# Patient Record
Sex: FEMALE | Race: WHITE | Employment: OTHER | URBAN - NONMETROPOLITAN AREA
[De-identification: names, ages, dates, MRNs, and addresses within clinical notes are randomized per-mention and may not be internally consistent; named-entity substitution may affect disease eponyms.]

---

## 2017-12-05 ENCOUNTER — APPOINTMENT (OUTPATIENT)
Dept: GENERAL RADIOLOGY | Age: 82
DRG: 595 | End: 2017-12-05
Payer: MEDICARE

## 2017-12-05 ENCOUNTER — HOSPITAL ENCOUNTER (INPATIENT)
Age: 82
LOS: 3 days | Discharge: NURSING, REHAB OR LONG-TERM CARE | DRG: 595 | End: 2017-12-09
Attending: INTERNAL MEDICINE | Admitting: INTERNAL MEDICINE
Payer: MEDICARE

## 2017-12-05 DIAGNOSIS — I21.4 NON-STEMI (NON-ST ELEVATED MYOCARDIAL INFARCTION) (HCC): Primary | ICD-10-CM

## 2017-12-05 PROBLEM — B02.9 SHINGLES: Status: ACTIVE | Noted: 2017-12-05

## 2017-12-05 LAB
ALBUMIN SERPL-MCNC: 2.9 G/DL (ref 3.5–5.1)
ALP BLD-CCNC: 68 U/L (ref 38–126)
ALT SERPL-CCNC: 18 U/L (ref 11–66)
ANION GAP SERPL CALCULATED.3IONS-SCNC: 12 MEQ/L (ref 8–16)
AST SERPL-CCNC: 24 U/L (ref 5–40)
BASOPHILS # BLD: 0.4 %
BASOPHILS ABSOLUTE: 0 THOU/MM3 (ref 0–0.1)
BILIRUB SERPL-MCNC: 0.4 MG/DL (ref 0.3–1.2)
BUN BLDV-MCNC: 31 MG/DL (ref 7–22)
CALCIUM SERPL-MCNC: 8.4 MG/DL (ref 8.5–10.5)
CHLORIDE BLD-SCNC: 104 MEQ/L (ref 98–111)
CO2: 25 MEQ/L (ref 23–33)
CREAT SERPL-MCNC: 0.7 MG/DL (ref 0.4–1.2)
EOSINOPHIL # BLD: 0.7 %
EOSINOPHILS ABSOLUTE: 0.1 THOU/MM3 (ref 0–0.4)
GFR SERPL CREATININE-BSD FRML MDRD: 79 ML/MIN/1.73M2
GLUCOSE BLD-MCNC: 188 MG/DL (ref 70–108)
HCT VFR BLD CALC: 39.3 % (ref 37–47)
HEMOGLOBIN: 13.4 GM/DL (ref 12–16)
LACTIC ACID: 2.8 MMOL/L (ref 0.5–2.2)
LYMPHOCYTES # BLD: 26.7 %
LYMPHOCYTES ABSOLUTE: 2 THOU/MM3 (ref 1–4.8)
MCH RBC QN AUTO: 31.8 PG (ref 27–31)
MCHC RBC AUTO-ENTMCNC: 34 GM/DL (ref 33–37)
MCV RBC AUTO: 93.4 FL (ref 81–99)
MONOCYTES # BLD: 10.1 %
MONOCYTES ABSOLUTE: 0.8 THOU/MM3 (ref 0.4–1.3)
NUCLEATED RED BLOOD CELLS: 0 /100 WBC
OSMOLALITY CALCULATION: 292.8 MOSMOL/KG (ref 275–300)
PDW BLD-RTO: 14.1 % (ref 11.5–14.5)
PLATELET # BLD: 211 THOU/MM3 (ref 130–400)
PMV BLD AUTO: 9.2 MCM (ref 7.4–10.4)
POTASSIUM SERPL-SCNC: 3.7 MEQ/L (ref 3.5–5.2)
RBC # BLD: 4.21 MILL/MM3 (ref 4.2–5.4)
SEG NEUTROPHILS: 62.1 %
SEGMENTED NEUTROPHILS ABSOLUTE COUNT: 4.7 THOU/MM3 (ref 1.8–7.7)
SODIUM BLD-SCNC: 141 MEQ/L (ref 135–145)
TOTAL PROTEIN: 6.1 G/DL (ref 6.1–8)
TROPONIN T: 0.02 NG/ML
WBC # BLD: 7.6 THOU/MM3 (ref 4.8–10.8)

## 2017-12-05 PROCEDURE — 36415 COLL VENOUS BLD VENIPUNCTURE: CPT

## 2017-12-05 PROCEDURE — 99284 EMERGENCY DEPT VISIT MOD MDM: CPT

## 2017-12-05 PROCEDURE — 84484 ASSAY OF TROPONIN QUANT: CPT

## 2017-12-05 PROCEDURE — 81001 URINALYSIS AUTO W/SCOPE: CPT

## 2017-12-05 PROCEDURE — 83605 ASSAY OF LACTIC ACID: CPT

## 2017-12-05 PROCEDURE — G0378 HOSPITAL OBSERVATION PER HR: HCPCS

## 2017-12-05 PROCEDURE — 85025 COMPLETE CBC W/AUTO DIFF WBC: CPT

## 2017-12-05 PROCEDURE — 87040 BLOOD CULTURE FOR BACTERIA: CPT

## 2017-12-05 PROCEDURE — 96375 TX/PRO/DX INJ NEW DRUG ADDON: CPT

## 2017-12-05 PROCEDURE — 96374 THER/PROPH/DIAG INJ IV PUSH: CPT

## 2017-12-05 PROCEDURE — 71020 XR CHEST STANDARD TWO VW: CPT

## 2017-12-05 PROCEDURE — 2580000003 HC RX 258: Performed by: PHYSICIAN ASSISTANT

## 2017-12-05 PROCEDURE — 80053 COMPREHEN METABOLIC PANEL: CPT

## 2017-12-05 PROCEDURE — 6360000002 HC RX W HCPCS: Performed by: PHYSICIAN ASSISTANT

## 2017-12-05 RX ORDER — DEXTROSE MONOHYDRATE 50 MG/ML
INJECTION, SOLUTION INTRAVENOUS CONTINUOUS
Status: DISCONTINUED | OUTPATIENT
Start: 2017-12-05 | End: 2017-12-05

## 2017-12-05 RX ORDER — LORAZEPAM 2 MG/ML
1 INJECTION INTRAMUSCULAR EVERY 6 HOURS PRN
Status: DISCONTINUED | OUTPATIENT
Start: 2017-12-05 | End: 2017-12-05

## 2017-12-05 RX ORDER — DIPHENHYDRAMINE HYDROCHLORIDE 50 MG/ML
25 INJECTION INTRAMUSCULAR; INTRAVENOUS ONCE
Status: COMPLETED | OUTPATIENT
Start: 2017-12-05 | End: 2017-12-05

## 2017-12-05 RX ORDER — GINSENG 100 MG
CAPSULE ORAL
Status: COMPLETED
Start: 2017-12-05 | End: 2017-12-06

## 2017-12-05 RX ADMIN — DIPHENHYDRAMINE HYDROCHLORIDE 25 MG: 50 INJECTION, SOLUTION INTRAMUSCULAR; INTRAVENOUS at 19:14

## 2017-12-05 RX ADMIN — DEXTROSE MONOHYDRATE: 50 INJECTION, SOLUTION INTRAVENOUS at 19:21

## 2017-12-05 RX ADMIN — Medication 1 MG: at 21:27

## 2017-12-06 PROBLEM — I48.0 PAROXYSMAL ATRIAL FIBRILLATION (HCC): Status: ACTIVE | Noted: 2017-12-06

## 2017-12-06 PROBLEM — L03.221 CELLULITIS OF NECK: Status: ACTIVE | Noted: 2017-12-06

## 2017-12-06 PROBLEM — G30.9 ALZHEIMER'S DEMENTIA WITH BEHAVIORAL DISTURBANCE (HCC): Status: ACTIVE | Noted: 2017-12-06

## 2017-12-06 PROBLEM — R79.89 ELEVATED LACTIC ACID LEVEL: Status: ACTIVE | Noted: 2017-12-06

## 2017-12-06 PROBLEM — F02.818 ALZHEIMER'S DEMENTIA WITH BEHAVIORAL DISTURBANCE (HCC): Status: ACTIVE | Noted: 2017-12-06

## 2017-12-06 PROBLEM — R77.8 ELEVATED TROPONIN: Status: ACTIVE | Noted: 2017-12-06

## 2017-12-06 LAB
ANION GAP SERPL CALCULATED.3IONS-SCNC: 15 MEQ/L (ref 8–16)
BACTERIA: ABNORMAL
BILIRUBIN URINE: ABNORMAL
BLOOD, URINE: NEGATIVE
BUN BLDV-MCNC: 23 MG/DL (ref 7–22)
C-REACTIVE PROTEIN: 2.64 MG/DL (ref 0–1)
CALCIUM SERPL-MCNC: 7.8 MG/DL (ref 8.5–10.5)
CASTS: ABNORMAL /LPF
CASTS: ABNORMAL /LPF
CHARACTER, URINE: CLEAR
CHLORIDE BLD-SCNC: 102 MEQ/L (ref 98–111)
CHOLESTEROL, TOTAL: 133 MG/DL (ref 100–199)
CO2: 23 MEQ/L (ref 23–33)
COLOR: ABNORMAL
CREAT SERPL-MCNC: 0.6 MG/DL (ref 0.4–1.2)
CRYSTALS: ABNORMAL
EKG ATRIAL RATE: 111 BPM
EKG P AXIS: 32 DEGREES
EKG Q-T INTERVAL: 338 MS
EKG QRS DURATION: 134 MS
EKG QTC CALCULATION (BAZETT): 459 MS
EKG R AXIS: 4 DEGREES
EKG T AXIS: 179 DEGREES
EKG VENTRICULAR RATE: 111 BPM
EPITHELIAL CELLS, UA: ABNORMAL /HPF
GFR SERPL CREATININE-BSD FRML MDRD: > 90 ML/MIN/1.73M2
GLUCOSE BLD-MCNC: 113 MG/DL (ref 70–108)
GLUCOSE, URINE: NEGATIVE MG/DL
HDLC SERPL-MCNC: 25 MG/DL
ICTOTEST: NEGATIVE
KETONES, URINE: NEGATIVE
LACTIC ACID: 1.8 MMOL/L (ref 0.5–2.2)
LDL CHOLESTEROL CALCULATED: 88 MG/DL
LEUKOCYTE ESTERASE, URINE: ABNORMAL
MAGNESIUM: 1.8 MG/DL (ref 1.6–2.4)
MISCELLANEOUS LAB TEST RESULT: ABNORMAL
MUCUS: ABNORMAL
NITRITE, URINE: NEGATIVE
PH UA: 5.5
POTASSIUM SERPL-SCNC: 3.7 MEQ/L (ref 3.5–5.2)
PROTEIN UA: ABNORMAL MG/DL
RBC URINE: ABNORMAL /HPF
RENAL EPITHELIAL, UA: ABNORMAL
SODIUM BLD-SCNC: 140 MEQ/L (ref 135–145)
SPECIFIC GRAVITY UA: 1.03 (ref 1–1.03)
TRIGL SERPL-MCNC: 100 MG/DL (ref 0–199)
TROPONIN T: 0.02 NG/ML
TROPONIN T: 0.03 NG/ML
TSH SERPL DL<=0.05 MIU/L-ACNC: 1.84 UIU/ML (ref 0.4–4.2)
UROBILINOGEN, URINE: 1 EU/DL
WBC UA: ABNORMAL /HPF
YEAST: ABNORMAL

## 2017-12-06 PROCEDURE — 83735 ASSAY OF MAGNESIUM: CPT

## 2017-12-06 PROCEDURE — 84443 ASSAY THYROID STIM HORMONE: CPT

## 2017-12-06 PROCEDURE — 93005 ELECTROCARDIOGRAM TRACING: CPT

## 2017-12-06 PROCEDURE — 6370000000 HC RX 637 (ALT 250 FOR IP): Performed by: NURSE PRACTITIONER

## 2017-12-06 PROCEDURE — 84484 ASSAY OF TROPONIN QUANT: CPT

## 2017-12-06 PROCEDURE — 99233 SBSQ HOSP IP/OBS HIGH 50: CPT | Performed by: INTERNAL MEDICINE

## 2017-12-06 PROCEDURE — 6360000002 HC RX W HCPCS: Performed by: NURSE PRACTITIONER

## 2017-12-06 PROCEDURE — 80048 BASIC METABOLIC PNL TOTAL CA: CPT

## 2017-12-06 PROCEDURE — 83605 ASSAY OF LACTIC ACID: CPT

## 2017-12-06 PROCEDURE — 96365 THER/PROPH/DIAG IV INF INIT: CPT

## 2017-12-06 PROCEDURE — 1200000003 HC TELEMETRY R&B

## 2017-12-06 PROCEDURE — 96367 TX/PROPH/DG ADDL SEQ IV INF: CPT

## 2017-12-06 PROCEDURE — 36415 COLL VENOUS BLD VENIPUNCTURE: CPT

## 2017-12-06 PROCEDURE — 2500000003 HC RX 250 WO HCPCS: Performed by: NURSE PRACTITIONER

## 2017-12-06 PROCEDURE — 86140 C-REACTIVE PROTEIN: CPT

## 2017-12-06 PROCEDURE — 2580000003 HC RX 258: Performed by: INTERNAL MEDICINE

## 2017-12-06 PROCEDURE — 99223 1ST HOSP IP/OBS HIGH 75: CPT | Performed by: NURSE PRACTITIONER

## 2017-12-06 PROCEDURE — 80061 LIPID PANEL: CPT

## 2017-12-06 PROCEDURE — 6370000000 HC RX 637 (ALT 250 FOR IP): Performed by: INTERNAL MEDICINE

## 2017-12-06 PROCEDURE — 2500000003 HC RX 250 WO HCPCS: Performed by: INTERNAL MEDICINE

## 2017-12-06 PROCEDURE — 6370000000 HC RX 637 (ALT 250 FOR IP)

## 2017-12-06 PROCEDURE — 99223 1ST HOSP IP/OBS HIGH 75: CPT | Performed by: NUCLEAR MEDICINE

## 2017-12-06 RX ORDER — EZETIMIBE 10 MG/1
10 TABLET ORAL DAILY
Status: DISCONTINUED | OUTPATIENT
Start: 2017-12-06 | End: 2017-12-06 | Stop reason: RX

## 2017-12-06 RX ORDER — CLOPIDOGREL BISULFATE 75 MG/1
75 TABLET ORAL DAILY
Status: DISCONTINUED | OUTPATIENT
Start: 2017-12-06 | End: 2017-12-07

## 2017-12-06 RX ORDER — SODIUM CHLORIDE 9 MG/ML
INJECTION, SOLUTION INTRAVENOUS CONTINUOUS
Status: DISCONTINUED | OUTPATIENT
Start: 2017-12-06 | End: 2017-12-06

## 2017-12-06 RX ORDER — SODIUM CHLORIDE 9 MG/ML
INJECTION, SOLUTION INTRAVENOUS CONTINUOUS
Status: DISCONTINUED | OUTPATIENT
Start: 2017-12-06 | End: 2017-12-07

## 2017-12-06 RX ORDER — DONEPEZIL HYDROCHLORIDE 23 MG/1
23 TABLET, FILM COATED ORAL NIGHTLY
Status: DISCONTINUED | OUTPATIENT
Start: 2017-12-06 | End: 2017-12-06 | Stop reason: SDUPTHER

## 2017-12-06 RX ORDER — METOPROLOL TARTRATE 50 MG/1
50 TABLET, FILM COATED ORAL 2 TIMES DAILY
Status: DISCONTINUED | OUTPATIENT
Start: 2017-12-06 | End: 2017-12-07

## 2017-12-06 RX ORDER — ONDANSETRON 2 MG/ML
4 INJECTION INTRAMUSCULAR; INTRAVENOUS EVERY 6 HOURS PRN
Status: DISCONTINUED | OUTPATIENT
Start: 2017-12-06 | End: 2017-12-10 | Stop reason: HOSPADM

## 2017-12-06 RX ORDER — METOPROLOL TARTRATE 5 MG/5ML
INJECTION INTRAVENOUS
Status: DISPENSED
Start: 2017-12-06 | End: 2017-12-06

## 2017-12-06 RX ORDER — ASPIRIN 81 MG/1
81 TABLET, CHEWABLE ORAL DAILY
Status: DISCONTINUED | OUTPATIENT
Start: 2017-12-06 | End: 2017-12-07

## 2017-12-06 RX ORDER — SODIUM CHLORIDE 0.9 % (FLUSH) 0.9 %
10 SYRINGE (ML) INJECTION EVERY 12 HOURS SCHEDULED
Status: DISCONTINUED | OUTPATIENT
Start: 2017-12-06 | End: 2017-12-10 | Stop reason: HOSPADM

## 2017-12-06 RX ORDER — GABAPENTIN 100 MG/1
100 CAPSULE ORAL 3 TIMES DAILY
Status: DISCONTINUED | OUTPATIENT
Start: 2017-12-06 | End: 2017-12-10 | Stop reason: HOSPADM

## 2017-12-06 RX ORDER — M-VIT,TX,IRON,MINS/CALC/FOLIC 27MG-0.4MG
1 TABLET ORAL DAILY
Status: DISCONTINUED | OUTPATIENT
Start: 2017-12-06 | End: 2017-12-06

## 2017-12-06 RX ORDER — SODIUM CHLORIDE 0.9 % (FLUSH) 0.9 %
10 SYRINGE (ML) INJECTION PRN
Status: DISCONTINUED | OUTPATIENT
Start: 2017-12-06 | End: 2017-12-10 | Stop reason: HOSPADM

## 2017-12-06 RX ORDER — DIPHENHYDRAMINE HYDROCHLORIDE 50 MG/ML
25 INJECTION INTRAMUSCULAR; INTRAVENOUS EVERY 6 HOURS PRN
Status: DISCONTINUED | OUTPATIENT
Start: 2017-12-06 | End: 2017-12-07

## 2017-12-06 RX ORDER — HYDROCODONE BITARTRATE AND ACETAMINOPHEN 5; 325 MG/1; MG/1
1 TABLET ORAL EVERY 6 HOURS PRN
Status: DISCONTINUED | OUTPATIENT
Start: 2017-12-06 | End: 2017-12-10 | Stop reason: HOSPADM

## 2017-12-06 RX ORDER — METOPROLOL TARTRATE 5 MG/5ML
5 INJECTION INTRAVENOUS ONCE
Status: DISCONTINUED | OUTPATIENT
Start: 2017-12-06 | End: 2017-12-10 | Stop reason: HOSPADM

## 2017-12-06 RX ORDER — ATORVASTATIN CALCIUM 10 MG/1
10 TABLET, FILM COATED ORAL NIGHTLY
Status: DISCONTINUED | OUTPATIENT
Start: 2017-12-06 | End: 2017-12-08

## 2017-12-06 RX ORDER — CLINDAMYCIN PHOSPHATE 600 MG/50ML
600 INJECTION INTRAVENOUS EVERY 8 HOURS
Status: DISCONTINUED | OUTPATIENT
Start: 2017-12-06 | End: 2017-12-06

## 2017-12-06 RX ORDER — MEMANTINE HYDROCHLORIDE 28 MG/1
28 CAPSULE, EXTENDED RELEASE ORAL DAILY
Status: DISCONTINUED | OUTPATIENT
Start: 2017-12-06 | End: 2017-12-06 | Stop reason: CLARIF

## 2017-12-06 RX ORDER — ACETAMINOPHEN 325 MG/1
650 TABLET ORAL EVERY 4 HOURS PRN
Status: DISCONTINUED | OUTPATIENT
Start: 2017-12-06 | End: 2017-12-10 | Stop reason: HOSPADM

## 2017-12-06 RX ORDER — LISINOPRIL 5 MG/1
5 TABLET ORAL DAILY
Status: DISCONTINUED | OUTPATIENT
Start: 2017-12-06 | End: 2017-12-06

## 2017-12-06 RX ORDER — 0.9 % SODIUM CHLORIDE 0.9 %
500 INTRAVENOUS SOLUTION INTRAVENOUS ONCE
Status: COMPLETED | OUTPATIENT
Start: 2017-12-06 | End: 2017-12-06

## 2017-12-06 RX ORDER — ESCITALOPRAM OXALATE 10 MG/1
5 TABLET ORAL DAILY
Status: DISCONTINUED | OUTPATIENT
Start: 2017-12-06 | End: 2017-12-10 | Stop reason: HOSPADM

## 2017-12-06 RX ORDER — LACTOBACILLUS RHAMNOSUS GG 10B CELL
1 CAPSULE ORAL
Status: DISCONTINUED | OUTPATIENT
Start: 2017-12-06 | End: 2017-12-10 | Stop reason: HOSPADM

## 2017-12-06 RX ORDER — DOCUSATE SODIUM 100 MG/1
100 CAPSULE, LIQUID FILLED ORAL 2 TIMES DAILY PRN
Status: DISCONTINUED | OUTPATIENT
Start: 2017-12-06 | End: 2017-12-10 | Stop reason: HOSPADM

## 2017-12-06 RX ORDER — MEMANTINE HYDROCHLORIDE 10 MG/1
10 TABLET ORAL 2 TIMES DAILY
Status: DISCONTINUED | OUTPATIENT
Start: 2017-12-06 | End: 2017-12-10 | Stop reason: HOSPADM

## 2017-12-06 RX ADMIN — GABAPENTIN 100 MG: 100 CAPSULE ORAL at 16:39

## 2017-12-06 RX ADMIN — SODIUM CHLORIDE: 9 INJECTION, SOLUTION INTRAVENOUS at 06:25

## 2017-12-06 RX ADMIN — MEMANTINE HYDROCHLORIDE 10 MG: 10 TABLET, FILM COATED ORAL at 20:58

## 2017-12-06 RX ADMIN — ASPIRIN 81 MG: 81 TABLET, CHEWABLE ORAL at 08:38

## 2017-12-06 RX ADMIN — ATORVASTATIN CALCIUM 10 MG: 10 TABLET, FILM COATED ORAL at 20:57

## 2017-12-06 RX ADMIN — SODIUM CHLORIDE 500 ML: 9 INJECTION, SOLUTION INTRAVENOUS at 04:27

## 2017-12-06 RX ADMIN — DIPHENHYDRAMINE HYDROCHLORIDE 25 MG: 50 INJECTION, SOLUTION INTRAMUSCULAR; INTRAVENOUS at 18:12

## 2017-12-06 RX ADMIN — BACITRACIN: 500 OINTMENT TOPICAL at 06:25

## 2017-12-06 RX ADMIN — Medication 1 CAPSULE: at 08:37

## 2017-12-06 RX ADMIN — DILTIAZEM HYDROCHLORIDE 5 MG/HR: 5 INJECTION INTRAVENOUS at 08:24

## 2017-12-06 RX ADMIN — DONEPEZIL HYDROCHLORIDE 22.5 MG: 10 TABLET, FILM COATED ORAL at 20:58

## 2017-12-06 RX ADMIN — MEMANTINE HYDROCHLORIDE 10 MG: 10 TABLET, FILM COATED ORAL at 08:37

## 2017-12-06 RX ADMIN — CLOPIDOGREL 75 MG: 75 TABLET, FILM COATED ORAL at 08:38

## 2017-12-06 RX ADMIN — CLINDAMYCIN PHOSPHATE 600 MG: 12 INJECTION, SOLUTION INTRAMUSCULAR; INTRAVENOUS at 06:40

## 2017-12-06 RX ADMIN — METOPROLOL TARTRATE 50 MG: 50 TABLET, FILM COATED ORAL at 08:37

## 2017-12-06 RX ADMIN — METOPROLOL TARTRATE 50 MG: 50 TABLET, FILM COATED ORAL at 20:58

## 2017-12-06 RX ADMIN — GABAPENTIN 100 MG: 100 CAPSULE ORAL at 20:58

## 2017-12-06 RX ADMIN — DILTIAZEM HYDROCHLORIDE 7.5 MG/HR: 5 INJECTION INTRAVENOUS at 23:34

## 2017-12-06 RX ADMIN — SODIUM CHLORIDE: 9 INJECTION, SOLUTION INTRAVENOUS at 10:30

## 2017-12-06 RX ADMIN — ESCITALOPRAM 5 MG: 10 TABLET, FILM COATED ORAL at 08:38

## 2017-12-06 NOTE — CONSULTS
no weight loss. No hematuria or  dysuria. No obvious abdominal pain, nausea, vomiting, or diarrhea. No  obvious suicidal ideation. The patient has underlying dementia. No skin  rashes. No obvious dizziness, lightheadedness or loss of consciousness. No recent trauma. No obvious bleeding problem. PAST MEDICAL HISTORY:  1. Coronary artery disease, bypass surgery. 2.  Hypertension. 3.  Hyperlipidemia. 4.  Cardiomyopathy. 5.  No known history of atrial fibrillation. ALLERGIES:  ALCOHOL. CURRENT MEDICATIONS:  Aspirin 81 a day, Lipitor 10 a day, Plavix 75 a day,  Aricept, Namenda 10 a day, Lopressor 5 IV, Cardizem drip. SOCIAL HISTORY:  No tobacco.  Denies alcohol. FAMILY HISTORY:  Noncontributory. PHYSICAL EXAMINATION:  VITAL SIGNS:  Showed a blood pressure of 130/80, heart rate of 100. GENERAL APPEARANCE:  A pleasant lady used to be somewhat confused in no  obvious acute distress. EYES AND EARS:  No discharge. NECK:  No JVD. No bruits. No masses. LUNGS:  Decreased air entry with distant sounds. HEART:  Normal S1, S2. Systolic murmur grade II/VI. ABDOMEN:  Soft, nontender. Positive bowel sounds. EXTREMITIES:  Mild to moderate pitting edema with good peripheral pulses. MUSCULOSKELETAL:  No obvious deformities. NEUROLOGIC:  The patient is talking, she is awake, alert, but I am not sure  how oriented she is. Waxes and wanes when I am asking her questions. No  obvious focal deficits. PSYCH:  No evidence of active psychosis. SKIN:  No rashes except for the left upper extremity and shoulder area,  which is dressed and it is the area of the presenting lesions. LABORATORY DATA:  Showed sodium 140, potassium 3.7, BUN 23, creatinine 0.6. White count 7.6, hemoglobin 13.4, hematocrit 39.3, platelets 389. Troponin  0.012. EKG showed left bundle branch block with AFib. IMPRESSION:  This is a very pleasant 19-year-old lady who unfortunately has  quite a bit of issues going on.

## 2017-12-06 NOTE — PROGRESS NOTES
greater of meals during LOS. · Monitoring: Meal Intake, Supplement Intake, Diet Tolerance, Wound Healing, Weight, Pertinent Labs, Chewing/Swallowing    See Adult Nutrition Doc Flowsheet for more detail.      Electronically signed by Jerson Aparicio RD, BERNIE on 12/6/17 at 1:32 PM    Contact Number: [phone]

## 2017-12-06 NOTE — PLAN OF CARE
Problem: Nutrition  Goal: Optimal nutrition therapy  Outcome: Ongoing  Nutrition Problem: Inadequate oral intake  Intervention: Food and/or Nutrient Delivery: Modify current diet, Start ONS  Nutritional Goals: Patient will consume 75% or greater of meals during LOS.

## 2017-12-06 NOTE — ED NOTES
Bacitracin gauze applied to open areas on neck and left trap area. Patient tolerated well.      Reba Kenney RN  12/05/17 2020

## 2017-12-06 NOTE — CARE COORDINATION
12/6/17, 10:01 AM      Michela Childers       Admitted from: ER 12/5/2017/ [facility] day: 0   Location: -11/011-A Reason for admit: Shingles [B02.9]  Shingles [B02.9] Status: IP   Admit order signed?: no  PMH:  has a past medical history of Aortic insufficiency: Mild by echo 2013; CAD (coronary artery disease); Cerebral aneurysm; Chest pain; Depression; Hyperlipidemia; Hypertension; LV dysfunction; Orthostatic hypotension; S/P PTCA (percutaneous transluminal coronary angioplasty): 7/13/2013: Stenting of PLB of LCx using Promus 2.5 X 12 mm, and Proximal LCX using Promus 3.5 X 12 mm; Serotonin syndrome; and Sinus bradycardia. Medications:  Scheduled Meds:   aspirin  81 mg Oral Daily    atorvastatin  10 mg Oral Nightly    clopidogrel  75 mg Oral Daily    escitalopram  5 mg Oral Daily    metoprolol tartrate  50 mg Oral BID    sodium chloride flush  10 mL Intravenous 2 times per day    clindamycin (CLEOCIN) IV  600 mg Intravenous Q8H    donepezil  22.5 mg Oral Nightly    memantine  10 mg Oral BID    metoprolol  5 mg Intravenous Once    magnesium replacement protocol   Other RX Placeholder    potassium replacement protocol   Other RX Placeholder    lactobacillus  1 capsule Oral Daily with breakfast     Continuous Infusions:   diltiazem (CARDIZEM) 125 mg in dextrose 5% 125 mL infusion 5 mg/hr (12/06/17 0824)    sodium chloride        Pertinent Info/Orders/Treatment Plan:  Ca+ 7.8, trops elev. . Telemetry, Afib with RVR. Cardizem gtt, echo ordered, cardiology consult pending. Open lesions from shingles on neck and back, IV ATB started, ID consult pending. Diet: DIET CARDIAC;   DVT Prophylaxis: ASA with SCD's ordered  Smoking status:  reports that she has never smoked.  She has never used smokeless tobacco.   Influenza Vaccination Screening Completed: yes  Pneumonia Vaccination Screening Completed: yes  Core measures: \"likely NSTEMI\"  Core MI measures:   ASA within 24 hours of arrival? yes  Beta Blocker within 24 hours of arrival?  yes   Currently on:  ASA:  yes  Beta Blocker: Yes   ACE/ARB if EF< 40%:  Echo pending   Statin:  yes   P2Y12 (Plavix, Brilinta, Effient):  yes  MI education folder given (which includes cardiac rehab education information booklet with contact numbers for continued follow-up after discharge) ? Not at this time, await confirmation of MI per cardio  Cardiac rehab ordered? Not at this time, await confirmation of MI per cardio  Dietitian consult ordered? Not at this time, await confirmation of MI per cardio    Nitro SL:  Needs script at discharge   PCP: Marcus Narayanan MD  Readmission:   no  Risk Score: 9     Discharge Planning  Current Residence:  Nursing Home, Assisted living  Living Arrangements:  Other (Comment) (assisted living staff)   Support Systems:  Children, Family Members, ECF/Assisted Living  Current Services PTA:     Potential Assistance Needed:  Alfonso Buchanan 58 Medications:  No  Does patient want to participate in local refill/ meds to beds program?  No  Type of Home Care Services:  Housekeeping, [address]  Patient expects to be discharged to:  [facility]   Expected Discharge date:  12/08/17  Follow Up Appointment: Best Day/ Time: Monday AM    Discharge Plan: From [address] [place] [address],[address].       Evaluation: yes

## 2017-12-06 NOTE — PROGRESS NOTES
Hospitalist Progress Note    Patient:  Leoncio Pimentel      Unit/Bed:6-11/011-A    YOB: 1931    MRN: [id number]       Acct: [de-identified]     PCP: Ranjith Campbell MD    Date of Admission: 12/5/2017    Chief Complaint: persistent shingles; pt scratches    Hospital Course: Just arrived. Adm for nh for same. Developed afib/rvr overnight with decr bp. Pt demented. Subjective: unable to give answers       Medications:  Reviewed    Infusion Medications    sodium chloride 100 mL/hr at 12/06/17 9497    diltiazem (CARDIZEM) 125 mg in dextrose 5% 125 mL infusion       Scheduled Medications    aspirin  81 mg Oral Daily    atorvastatin  10 mg Oral Nightly    clopidogrel  75 mg Oral Daily    escitalopram  5 mg Oral Daily    metoprolol tartrate  50 mg Oral BID    sodium chloride flush  10 mL Intravenous 2 times per day    clindamycin (CLEOCIN) IV  600 mg Intravenous Q8H    donepezil  22.5 mg Oral Nightly    memantine  10 mg Oral BID    metoprolol        metoprolol  5 mg Intravenous Once    magnesium replacement protocol   Other RX Placeholder    potassium replacement protocol   Other RX Placeholder    lactobacillus  1 capsule Oral Daily with breakfast     PRN Meds: acetaminophen, HYDROcodone-acetaminophen, sodium chloride flush, docusate sodium, ondansetron, diphenhydrAMINE    No intake or output data in the 24 hours ending 12/06/17 0636    Diet:  DIET CARDIAC; Exam:  BP (!) 133/97   Pulse 87   Temp 98.6 °F (37 °C) (Axillary)   Resp 18   Ht 5' 2\" (1.575 m)   Wt 179 lb 1.6 oz (81.2 kg)   SpO2 96%   BMI 32.76 kg/m²     General appearance: Chronically ill appearing      HEENT: Pupils equal, round, and reactive to light. Conjunctivae/corneas clear. Neck: Supple, with full range of motion. No jugular venous distention. Trachea midline. Respiratory:  Normal respiratory effort. Clear to auscultation, bilaterally without Rales/Wheezes/Rhonchi.   Cardiovascular: irreg, diminished dementia with behavioral disturbance [G30.8, F02.81] 12/06/2017    Cellulitis of neck [L03.221] 12/06/2017    Paroxysmal atrial fibrillation ([facility] [address] [I48.0] 12/06/2017    Shingles [B02.9] 12/05/2017    History of cerebral aneurysm repair [J96.121, Z86.79]     Depression [F32.9]     Essential hypertension [I10] 01/18/2012    Dyslipidemia [E78.5] 01/18/2012    Hx of CABG [Z95.1] 01/18/2012    CAD (coronary artery disease) [I25.10]        1. Persistent shingle with poss cellulitis- consult  [facility]  2.  Afib/rvr- ck mg, add dilitiazem drip; consult Cardio        Electronically signed by Kane Balbuena MD on 12/6/2017 at 6:36 AM

## 2017-12-06 NOTE — PLAN OF CARE
Problem: DISCHARGE BARRIERS  Goal: Patient's continuum of care needs are met  Outcome: Ongoing  New placement to [facility]

## 2017-12-06 NOTE — PROGRESS NOTES
PHARMACY NOTE  Miranda Yanni was ordered Zetia. Per the [address], this medication is non-formulary and not stocked by pharmacy. The medication can be reordered at discharge.       Thank you,  Sd Enriquez, PharmD

## 2017-12-06 NOTE — CONSULTS
Consults                                      CONSULTATION NOTE :ID       Patient - Lars Howard,  Age - 80 y.o.    - 1931      Room Number - 6K-11/011-A   MRN -  [id number]   [facility]  Date of Admission -  2017  6:38 PM  Patient's PCP: Shanna Uribe MD     Requesting Physician: Binu Hagan MD    REASON FOR CONSULTATION   Open wound post HZ    HISTORY OF PRESENT ILLNESS       This is a  80 y.o. female who was admitted to the hospital with a chief complaints of open wound on the left neck area. She had HZ three wks ago. patient has been scratching the area involved with the HZ. Developed redness and there was concern for infection for which reason she came here. She has hx of dementia and CAD. She reports of pain on he area, no fever, no chest pain. she had dry cough. Her medical record was reviewed as patient is not good historian    PAST MEDICAL AND SURGICAL HISTORY       Past Medical History:   Diagnosis Date    Aortic insufficiency: Mild by echo 2015    CAD (coronary artery disease)     Cerebral aneurysm 8/31/10    s/p coiling [facility]    Chest pain     Depression     Hyperlipidemia     Hypertension     LV dysfunction     Orthostatic hypotension     S/P PTCA (percutaneous transluminal coronary angioplasty): 2013: Stenting of PLB of LCx using Promus 2.5 X 12 mm, and Proximal LCX using Promus 3.5 X 12 mm 10/1/2013    2013: Stenting of PLB of LCx using Promus 2.5 X 12 mm, and Proximal LCX using Promus 3.5 X 12 mm. [place] in [place].  Serotonin syndrome     Sinus bradycardia 2014       Past Surgical History:   Procedure Laterality Date    APPENDECTOMY      BRAIN ANEURYSM SURGERY      CARDIAC SURGERY      CARDIOVASCULAR STRESS TEST  11-23-10    [facility] rhythm. Occasional PAC's and PVC's. Episodes of SVT of only few beats, possible short bursts of atrial fibrillation. No V-tach and no pauses. No significant symptoms.     CHOLECYSTECTOMY      COLONOSCOPY      CORONARY ANGIOPLASTY WITH STENT PLACEMENT  7-13-12    2 stents    CORONARY ARTERY BYPASS GRAFT  2007    DIAGNOSTIC CARDIAC CATH LAB PROCEDURE  11/18/10    Patent JAMIL to LAD, patent vein graft to OM branch. Subtotally occluded native LAD. Moderate lesion in OM branch w/ competitive flow into vein graft. Moderate disease in codominant left circ. Mild diffuse disease in dominant RCA. Anteroapical and inferoapical hypokinesis. EF 30-35%.  ENDOSCOPY, COLON, DIAGNOSTIC      EYE SURGERY      bilateral cataracts    HYSTERECTOMY      TONSILLECTOMY      TRANSTHORACIC ECHOCARDIOGRAM  3-01-11    LV size and systolic function normal. EF 60-65%. Grade 1 diastolic dysfunction. Mild MV annular calcification. Trivial MR. AV leaflets exhibited mildly increased thickness, mild calcification, and normal cuspal separation. Mild AR and TR. Aortic root exhibited mild dilatation.  TRANSTHORACIC ECHOCARDIOGRAM  11-17-10    LV size and systolic function normal. EF 30-35%. Apical anterior wall was hypokinetic. Grade 1 diastolic dysfunction. AV leaflets exhibited normal thickness, normal cuspal separation, and sclerosis. Trivial AR.  TRANSTHORACIC ECHOCARDIOGRAM  9-02-10    Normal LV size w/ hyperdynamic LV function. EF 65%. RV is not completely visualized. E to A reversal in MV flow pattern suggestive of diastolic dysfunction. Mild to moderate TR. RVSP 45mmHg.      VASCULAR SURGERY      cabg harvests from right leg         MEDICATIONS PRIOR TO ADMISSION:       Scheduled Meds:   aspirin  81 mg Oral Daily    atorvastatin  10 mg Oral Nightly    clopidogrel  75 mg Oral Daily    escitalopram  5 mg Oral Daily    metoprolol tartrate  50 mg Oral BID    sodium chloride flush  10 mL Intravenous 2 times per day    donepezil  22.5 mg Oral Nightly    memantine  10 mg Oral BID    metoprolol  5 mg Intravenous Once    magnesium replacement protocol   Other RX Placeholder    potassium replacement protocol   Other RX Placeholder    lactobacillus  1 capsule Oral Daily with breakfast     Continuous Infusions:   diltiazem (CARDIZEM) 125 mg in dextrose 5% 125 mL infusion 5 mg/hr (12/06/17 0824)    sodium chloride       PRN Meds:acetaminophen, HYDROcodone-acetaminophen, sodium chloride flush, docusate sodium, ondansetron, diphenhydrAMINE  Allergies:   ALLERGIES: Alcohol           SOCIAL HISTORY:     TOBACCO:   reports that she has never smoked. She has never used smokeless tobacco.     ETOH:   reports that she does not drink alcohol. Patient currently lives in a nursing home      FAMILY HISTORY:         Problem Relation Age of Onset    Hypertension Mother     High Cholesterol Mother     High Blood Pressure Daughter     High Blood Pressure Son        REVIEW OF SYSTEMS:     Constitutional: no fever, no night sweats, + fatigue. Head: no head ache , no head injury, no migranes. Eye: no blurring of vision, no double vision. Ears: +hearing difficulty, no tinnitus  Mouth/throat: no ulceration, dental caries , dysphagia  Lungs: + cough no chest pain  CVS: no palpitation, no chest pain, no shortness of breath  GI: no abdominal pain, no nausea , no vomiting, no constipation  SHANICE: no dysuria, frequency and urgency, no hematuria, no kidney stones  Musculoskeletal: no joint pain, swelling , +stiffness,  Endocrine: no polyuria, polydipsia, no cold or heat intolerance  Hematology: no anemia, no easy brusing or bleeding, no hx of clotting disorder  Dermatology: recent HZ      PHYSICAL EXAM:     BP (!) 133/97   Pulse 121   Temp 98.6 °F (37 °C) (Oral)   Resp 14   Ht 5' 2\" (1.575 m)   Wt 179 lb 1.6 oz (81.2 kg)   SpO2 94%   BMI 32.76 kg/m²   General:  Awake, alert, chronically sick looking. HEENT: pink conjunctiva, unicteric sclera, moist oral mucosa.   Open wound on the left neck , shoulder area on dermatome :C3,C4 on the left  Chest:  Bilateral air entry diminished breath sound  Cardiovascular:  RRR ,S1S2, no murmur or gallop. Abdomen:  Soft, non tender to palpation. Extremities: no blisters  Skin:  Warm and dry. CNS:awake and answers few questions        LABS:     CBC:   Recent Labs      12/05/17 1926   WBC  7.6   HGB  13.4   PLT  211     BMP:    Recent Labs      12/05/17 1926 12/06/17 0630   NA  141  140   K  3.7  3.7   CL  104  102   CO2  25  23   BUN  31*  23*   CREATININE  0.7  0.6   GLUCOSE  188*  113*     Calcium:  Recent Labs      12/06/17 0630   CALCIUM  7.8*     Ionized Calcium:No results for input(s): IONCA in the last 72 hours. Magnesium:  Recent Labs      12/06/17 0630   MG  1.8     Phosphorus:No results for input(s): PHOS in the last 72 hours. BNP:No results for input(s): BNP in the last 72 hours. Glucose:No results for input(s): POCGLU in the last 72 hours. HgbA1C: No results for input(s): LABA1C in the last 72 hours. INR: No results for input(s): INR in the last 72 hours. Hepatic:   Recent Labs      12/05/17 1926   ALKPHOS  68   ALT  18   AST  24   PROT  6.1   BILITOT  0.4   LABALBU  2.9*     Amylase and Lipase:  Recent Labs      12/06/17 0518   LACTA  1.8     Lactic Acid:   Recent Labs      12/06/17 0518   LACTA  1.8     Troponin: No results for input(s): CKTOTAL, CKMB, TROPONINI in the last 72 hours. BNP: No results for input(s): BNP in the last 72 hours. Lipids:   Recent Labs      12/06/17 0518   CHOL  133   TRIG  100   HDL  25   LDLCALC  88     ABGs: No results found for: PHART, PO2ART, JQI1VWB, MOB3PVB, BEART    Cultures:      CXR:       UA: No results for input(s): SPECGRAV, PHUR, COLORU, CLARITYU, MUCUS, PROTEINU, BLOODU, RBCUA, WBCUA, BACTERIA, NITRU, GLUCOSEU, BILIRUBINUR, UROBILINOGEN, KETUA, LABCAST, LABCASTTY, AMORPHOS in the last 72 hours.     Invalid input(s): CRYSTALS      IMAGING:    Micro:   Lab Results   Component Value Date    BC No growth-preliminary 12/05/2017       Problem list of patient      Patient Active Problem List   Diagnosis Code    CAD (coronary artery

## 2017-12-06 NOTE — H&P
[address] [id number]                               HISTORY AND PHYSICAL    PATIENT NAME: Lashae Hernandez                   :        1931  MED REC NO:   [id number]                           ROOM:       0011  ACCOUNT NO:   [de-identified]                           ADMIT DATE: 2017  PROVIDER:     Raf Teixeira. Nikole Pickett      CHIEF COMPLAINT:  Open lesions on her neck and left upper back and cough. HISTORY OF PRESENT ILLNESS:  The patient is an 42-year-old  female  with a known past medical history significant for a brain aneurysm. She  did undergo coiling approximately seven years ago. This was done at  [facility]. The patient does also have a history of Alzheimer's dementia  where she can be aggressive with some behavioral concerns at times. Diastolic dysfunction, I noted the patient did have echocardiogram that was  completed back in . This did reveal left ventricular ejection fraction  to be 55 to 60% with grade 1 diastolic dysfunction. Atherosclerotic heart  disease, status post two vessel CABG in . She did undergo stenting in  . Essential hypertension and dyslipidemia. The patient is a resident  at [facility]. The patient had received Ativan and  Benadryl prior to my arrival, so most of this information was obtained from  daughters present at the bedside. Family tells me that the patient had  onset of shingles approximately three weeks ago and since that time, she  \"couldn't leave it alone. \"  She has been complaining a lot of itching and  she has been \"rubbed it\" to the point where she has many open lesions on  her neck and on the upper part of her left side of her back. These have  been draining a very scant amount of serosanguineous drainage. They tell  me that the assisted living facility really has not been putting anything  on it.   It has become very warm and reddened and family became somewhat  concerned. No complaint of fever or chills and it was suggested that she  may benefit from further evaluation, so she was brought to the emergency  room this evening. Upon arrival, temperature is 98.1, heart rate is 84,  respiratory rate is 18, blood pressure is 132/86. She was saturating 97%  on room air. While in the emergency room, the patient did receive Benadryl  25 mg IV x1 and Ativan 1 mg IV x1. Family tells me that she has had a dry  nonproductive cough. They have not noticed any type of nasal drainage. There has been no change in her appetite and again, no complaint of fever  per their knowledge. PAST MEDICAL HISTORY:  Significant for atherosclerotic heart disease status  post CABG, coronary artery disease status post stenting, Alzheimer's  dementia with behavioral disturbance. The patient is able to dress herself  and feed herself; however, she does require the use of a cane or walker at  times. She has difficulty holding her head up straight. She walks with a  shuffled gait and she does have urinary incontinence. Brain aneurysm,  status post coiling, essential hypertension, dyslipidemia, serotonin  syndrome, sinus bradycardia, orthostatic hypotension, shingles, and  diastolic dysfunction. PAST SURGICAL HISTORY:  Significant for a two-vessel CABG, tonsillectomy,  hysterectomy, eye surgery, colonoscopy, cardiac catheterization, PTCA and  stenting, colonoscopy, cholecystectomy, brain aneurysm coiling, and  appendectomy. FAMILY HISTORY:  Mother is diseased with history of essential hypertension  and dyslipidemia. Father is diseased. SOCIAL HISTORY:  The patient is a . She lives at [facility]. No history of alocohol, smoking, or illicit drug use. HOME MEDICATIONS: Aspirin 81 mg daily, cephalexin suspension.    This was started on 12/04/2017, Plavix 75 mg daily, donepezil HCL 23 mg  daily, escitalopram oxalate 10 mg daily, Cardiology and Infectious Disease has been  consulted as well as . Family is somewhat concerned because  she currently lives in assisted living and the patient may need a skilled  care on discharge. Further recommendations based on diagnostic testing and the  patient's clinical course. [facility] Abdul CNP    D: 12/06/2017 5:54:00       T: 12/06/2017 8:08:47     [email or web address]  Job#: [id number]     Doc#: [id number]    CC:

## 2017-12-06 NOTE — ED PROVIDER NOTES
excoriations appear infected. Psychiatric: She has a normal mood and affect. Her behavior is normal. Thought content normal.   Nursing note and vitals reviewed. DIFFERENTIAL DIAGNOSIS:   Including but not limited to: infected shingles, pneumonia. DIAGNOSTIC RESULTS     EKG: All EKG's are interpreted by the Emergency Department Physician who either signs or Co-signs this chart in the absence of a cardiologist.    None    RADIOLOGY: non-plain film images(s) such as CT, Ultrasound and MRI are read by the radiologist.    XR CHEST STANDARD (2 VW)   Final Result   Cardiomegaly. No other acute findings            **This report has been created using voice recognition software. It may contain minor errors which are inherent in voice recognition technology. **      Final report electronically signed by Dr. Mane Alves on 12/5/2017 8:00 PM          LABS:   Labs Reviewed   CBC WITH AUTO DIFFERENTIAL - Abnormal; Notable for the following:        Result Value    MCH 31.8 (*)     All other components within normal limits   COMPREHENSIVE METABOLIC PANEL - Abnormal; Notable for the following:     Glucose 188 (*)     BUN 31 (*)     Calcium 8.4 (*)     Alb 2.9 (*)     All other components within normal limits   TROPONIN - Abnormal; Notable for the following:     Troponin T 0.017 (*)     All other components within normal limits   LACTIC ACID, PLASMA - Abnormal; Notable for the following:     Lactic Acid 2.8 (*)     All other components within normal limits   GLOMERULAR FILTRATION RATE, ESTIMATED - Abnormal; Notable for the following:     Est, Glom Filt Rate 79 (*)     All other components within normal limits   CULTURE BLOOD #2   CULTURE BLOOD #1   ANION GAP   OSMOLALITY   URINALYSIS WITH MICROSCOPIC       EMERGENCY DEPARTMENT COURSE:   Vitals:    Vitals:    12/05/17 1959 12/05/17 2114 12/05/17 2159 12/05/17 2256   BP: 112/87 124/82 121/80 130/70   Pulse: 88 96 71 76   Resp: 18 18 18 18   Temp:       TempSrc:       SpO2:

## 2017-12-07 ENCOUNTER — APPOINTMENT (OUTPATIENT)
Dept: GENERAL RADIOLOGY | Age: 82
DRG: 595 | End: 2017-12-07
Payer: MEDICARE

## 2017-12-07 LAB
BASOPHILS # BLD: 0.1 %
BASOPHILS ABSOLUTE: 0 THOU/MM3 (ref 0–0.1)
EOSINOPHIL # BLD: 0.4 %
EOSINOPHILS ABSOLUTE: 0 THOU/MM3 (ref 0–0.4)
HCT VFR BLD CALC: 38.2 % (ref 37–47)
HEMOGLOBIN: 13 GM/DL (ref 12–16)
LACTIC ACID: 1.4 MMOL/L (ref 0.5–2.2)
LYMPHOCYTES # BLD: 19.8 %
LYMPHOCYTES ABSOLUTE: 1.7 THOU/MM3 (ref 1–4.8)
MCH RBC QN AUTO: 31.2 PG (ref 27–31)
MCHC RBC AUTO-ENTMCNC: 34.1 GM/DL (ref 33–37)
MCV RBC AUTO: 91.5 FL (ref 81–99)
MONOCYTES # BLD: 4.8 %
MONOCYTES ABSOLUTE: 0.4 THOU/MM3 (ref 0.4–1.3)
NUCLEATED RED BLOOD CELLS: 0 /100 WBC
PDW BLD-RTO: 14.1 % (ref 11.5–14.5)
PLATELET # BLD: 246 THOU/MM3 (ref 130–400)
PMV BLD AUTO: 9.1 MCM (ref 7.4–10.4)
PRO-BNP: 3800 PG/ML (ref 0–1800)
PROCALCITONIN: 0.05 NG/ML (ref 0.01–0.09)
RBC # BLD: 4.17 MILL/MM3 (ref 4.2–5.4)
SEG NEUTROPHILS: 74.9 %
SEGMENTED NEUTROPHILS ABSOLUTE COUNT: 6.4 THOU/MM3 (ref 1.8–7.7)
WBC # BLD: 8.6 THOU/MM3 (ref 4.8–10.8)

## 2017-12-07 PROCEDURE — 6370000000 HC RX 637 (ALT 250 FOR IP): Performed by: NURSE PRACTITIONER

## 2017-12-07 PROCEDURE — 83605 ASSAY OF LACTIC ACID: CPT

## 2017-12-07 PROCEDURE — 83880 ASSAY OF NATRIURETIC PEPTIDE: CPT

## 2017-12-07 PROCEDURE — 99232 SBSQ HOSP IP/OBS MODERATE 35: CPT | Performed by: NURSE PRACTITIONER

## 2017-12-07 PROCEDURE — 6370000000 HC RX 637 (ALT 250 FOR IP): Performed by: INTERNAL MEDICINE

## 2017-12-07 PROCEDURE — 2580000003 HC RX 258: Performed by: INTERNAL MEDICINE

## 2017-12-07 PROCEDURE — 97110 THERAPEUTIC EXERCISES: CPT

## 2017-12-07 PROCEDURE — 85025 COMPLETE CBC W/AUTO DIFF WBC: CPT

## 2017-12-07 PROCEDURE — 2500000003 HC RX 250 WO HCPCS: Performed by: INTERNAL MEDICINE

## 2017-12-07 PROCEDURE — 84145 PROCALCITONIN (PCT): CPT

## 2017-12-07 PROCEDURE — 99233 SBSQ HOSP IP/OBS HIGH 50: CPT | Performed by: INTERNAL MEDICINE

## 2017-12-07 PROCEDURE — 6360000002 HC RX W HCPCS: Performed by: INTERNAL MEDICINE

## 2017-12-07 PROCEDURE — G8978 MOBILITY CURRENT STATUS: HCPCS

## 2017-12-07 PROCEDURE — G8979 MOBILITY GOAL STATUS: HCPCS

## 2017-12-07 PROCEDURE — 1200000003 HC TELEMETRY R&B

## 2017-12-07 PROCEDURE — 71010 XR CHEST PORTABLE: CPT

## 2017-12-07 PROCEDURE — 36415 COLL VENOUS BLD VENIPUNCTURE: CPT

## 2017-12-07 PROCEDURE — 94640 AIRWAY INHALATION TREATMENT: CPT

## 2017-12-07 PROCEDURE — 97162 PT EVAL MOD COMPLEX 30 MIN: CPT

## 2017-12-07 PROCEDURE — 2580000003 HC RX 258: Performed by: NURSE PRACTITIONER

## 2017-12-07 RX ORDER — DIGOXIN 0.25 MG/ML
250 INJECTION INTRAMUSCULAR; INTRAVENOUS EVERY 4 HOURS
Status: DISCONTINUED | OUTPATIENT
Start: 2017-12-07 | End: 2017-12-07

## 2017-12-07 RX ORDER — IPRATROPIUM BROMIDE AND ALBUTEROL SULFATE 2.5; .5 MG/3ML; MG/3ML
1 SOLUTION RESPIRATORY (INHALATION)
Status: DISCONTINUED | OUTPATIENT
Start: 2017-12-07 | End: 2017-12-10 | Stop reason: HOSPADM

## 2017-12-07 RX ORDER — BUMETANIDE 0.25 MG/ML
1 INJECTION, SOLUTION INTRAMUSCULAR; INTRAVENOUS ONCE
Status: COMPLETED | OUTPATIENT
Start: 2017-12-07 | End: 2017-12-07

## 2017-12-07 RX ORDER — DIPHENHYDRAMINE HCL 25 MG
25 TABLET ORAL EVERY 6 HOURS PRN
Status: DISCONTINUED | OUTPATIENT
Start: 2017-12-07 | End: 2017-12-10 | Stop reason: HOSPADM

## 2017-12-07 RX ORDER — METOPROLOL TARTRATE 100 MG/1
100 TABLET ORAL 2 TIMES DAILY
Status: DISCONTINUED | OUTPATIENT
Start: 2017-12-07 | End: 2017-12-10 | Stop reason: HOSPADM

## 2017-12-07 RX ORDER — VANCOMYCIN HYDROCHLORIDE 1 G/200ML
1000 INJECTION, SOLUTION INTRAVENOUS EVERY 24 HOURS
Status: DISCONTINUED | OUTPATIENT
Start: 2017-12-07 | End: 2017-12-08

## 2017-12-07 RX ORDER — DIGOXIN 250 MCG
250 TABLET ORAL EVERY 4 HOURS
Status: COMPLETED | OUTPATIENT
Start: 2017-12-07 | End: 2017-12-07

## 2017-12-07 RX ORDER — ASPIRIN 81 MG/1
81 TABLET, CHEWABLE ORAL EVERY OTHER DAY
Status: DISCONTINUED | OUTPATIENT
Start: 2017-12-08 | End: 2017-12-09

## 2017-12-07 RX ADMIN — GABAPENTIN 100 MG: 100 CAPSULE ORAL at 14:47

## 2017-12-07 RX ADMIN — ENOXAPARIN SODIUM 80 MG: 80 INJECTION SUBCUTANEOUS at 20:17

## 2017-12-07 RX ADMIN — DIGOXIN 250 MCG: 0.25 TABLET ORAL at 17:34

## 2017-12-07 RX ADMIN — DILTIAZEM HYDROCHLORIDE 30 MG: 30 TABLET, FILM COATED ORAL at 23:35

## 2017-12-07 RX ADMIN — BUMETANIDE 1 MG: 0.25 INJECTION INTRAMUSCULAR; INTRAVENOUS at 17:33

## 2017-12-07 RX ADMIN — DONEPEZIL HYDROCHLORIDE 22.5 MG: 10 TABLET, FILM COATED ORAL at 20:16

## 2017-12-07 RX ADMIN — DIPHENHYDRAMINE HCL 25 MG: 25 TABLET ORAL at 20:16

## 2017-12-07 RX ADMIN — Medication 10 ML: at 12:44

## 2017-12-07 RX ADMIN — METOPROLOL TARTRATE 100 MG: 100 TABLET, FILM COATED ORAL at 20:17

## 2017-12-07 RX ADMIN — MEMANTINE HYDROCHLORIDE 10 MG: 10 TABLET, FILM COATED ORAL at 20:17

## 2017-12-07 RX ADMIN — ESCITALOPRAM 5 MG: 10 TABLET, FILM COATED ORAL at 09:33

## 2017-12-07 RX ADMIN — MEMANTINE HYDROCHLORIDE 10 MG: 10 TABLET, FILM COATED ORAL at 09:32

## 2017-12-07 RX ADMIN — ENOXAPARIN SODIUM 80 MG: 80 INJECTION SUBCUTANEOUS at 09:33

## 2017-12-07 RX ADMIN — ASPIRIN 81 MG: 81 TABLET, CHEWABLE ORAL at 09:36

## 2017-12-07 RX ADMIN — GABAPENTIN 100 MG: 100 CAPSULE ORAL at 20:17

## 2017-12-07 RX ADMIN — GABAPENTIN 100 MG: 100 CAPSULE ORAL at 09:32

## 2017-12-07 RX ADMIN — VANCOMYCIN HYDROCHLORIDE 1000 MG: 1 INJECTION, SOLUTION INTRAVENOUS at 17:43

## 2017-12-07 RX ADMIN — DIGOXIN 250 MCG: 0.25 TABLET ORAL at 12:42

## 2017-12-07 RX ADMIN — Medication 1 CAPSULE: at 09:36

## 2017-12-07 RX ADMIN — DIGOXIN 250 MCG: 0.25 INJECTION INTRAMUSCULAR; INTRAVENOUS at 09:34

## 2017-12-07 RX ADMIN — METOPROLOL TARTRATE 100 MG: 100 TABLET, FILM COATED ORAL at 09:33

## 2017-12-07 RX ADMIN — DILTIAZEM HYDROCHLORIDE 30 MG: 30 TABLET, FILM COATED ORAL at 15:57

## 2017-12-07 RX ADMIN — CEFEPIME HYDROCHLORIDE 2 G: 2 INJECTION, POWDER, FOR SOLUTION INTRAVENOUS at 17:32

## 2017-12-07 RX ADMIN — IPRATROPIUM BROMIDE AND ALBUTEROL SULFATE 1 AMPULE: .5; 3 SOLUTION RESPIRATORY (INHALATION) at 16:52

## 2017-12-07 ASSESSMENT — PAIN SCALES - GENERAL: PAINLEVEL_OUTOF10: 10

## 2017-12-07 NOTE — PROGRESS NOTES
[facility]  OCCUPATIONAL THERAPY MISSED TREATMENT NOTE  [facility] RENAL TELEMETRY 6K  6K-11011-A      Date: 2017  Patient Name: Daniela Dixon        CSN: [id number]   : 1931  (80 y.o.)  Gender: female   Referring Practitioner: Saskia Merritt MD            REASON FOR MISSED TREATMENT:  OT attempted at this time. On arrival when introducing self pt stated \"I don't need therapy. \" Pt reported still finishing lunch and declined participation at this time. Will re attempt as able.

## 2017-12-07 NOTE — PROGRESS NOTES
clear.  Neck: Supple, with full range of motion. No jugular venous distention. Trachea midline. Respiratory:  Normal respiratory effort. Clear to auscultation, bilaterally without Rales/Wheezes/Rhonchi. Cardiovascular: irreg, diminished tones, no murmur. Abdomen: Soft, non-tender, non-distended with normal bowel sounds. Musculoskeletal: No clubbing, cyanosis or edema bilaterally. Full range of motion without deformity. Skin: large area of erythema, scratchmarks left neck/chest.  Neurologic:   No focal deficits, keeps eyes closed, no understandable speech  Psychiatric: above        Labs:   Recent Labs      12/05/17 1926   WBC  7.6   HGB  13.4   HCT  39.3   PLT  211     Recent Labs      12/05/17 1926 12/06/17   0630   NA  141  140   K  3.7  3.7   CL  104  102   CO2  25  23   BUN  31*  23*   CREATININE  0.7  0.6   CALCIUM  8.4*  7.8*     Recent Labs      12/05/17 1926   AST  24   ALT  18   BILITOT  0.4   ALKPHOS  68     No results for input(s): INR in the last 72 hours. No results for input(s): [place] in the last 72 hours. Urinalysis:      Lab Results   Component Value Date    NITRU NEGATIVE 12/05/2017    WBCUA 15-25 12/05/2017    BACTERIA NONE 12/05/2017    RBCUA 5-10 12/05/2017    BLOODU NEGATIVE 12/05/2017    SPECGRAV 1.026 12/05/2017       Radiology:  XR CHEST STANDARD (2 VW)   Final Result   Cardiomegaly. No other acute findings            **This report has been created using voice recognition software. It may contain minor errors which are inherent in voice recognition technology. **      Final report electronically signed by Dr. Mane Alves on 12/5/2017 8:00 PM          Diet: Dietary Nutrition Supplements: Low Calorie High Protein Supplement  DIET CARDIAC;  Dental Soft    DVT prophylaxis: [x] Lovenox                                 [] SCDs                                 [] SQ Heparin                                 [] Encourage ambulation           [] Already on Anticoagulation Disposition:    [] Home       [] TCU       [] Rehab       [] Psych       [x] SNF       [] [place]       [] Other-    Code Status: DNR-CC        Assessment/Plan:    Anticipated Discharge in : ?    Active Hospital Problems    Diagnosis Date Noted    Wide-complex tachycardia ([facility] [address] [I47.2]      Priority: High    Coronary artery disease involving coronary bypass graft of native heart without angina pectoris [I25.810]      Priority: High    Elevated troponin [R74.8] 12/06/2017    Elevated lactic acid level [R79.89] 12/06/2017    Alzheimer's dementia with behavioral disturbance [G30.8, F02.81] 12/06/2017    Cellulitis of neck [L03.221] 12/06/2017    Paroxysmal atrial fibrillation ([facility] [address] [I48.0] 12/06/2017    Shingles [B02.9] 12/05/2017    History of cerebral aneurysm repair [T34.825, Z86.79]     Depression [F32.9]     Essential hypertension [I10] 01/18/2012    Dyslipidemia [E78.5] 01/18/2012    Hx of CABG [Z95.1] 01/18/2012    CAD (coronary artery disease) [I25.10]        1. Persistent shingle with ? postshingles neuritis- try neurontin  2.  Afib/rvr- ck mg, addeddilitiazem drip; consult Cardio; will boost lopressor and add digoxin; lovenox for now for anticoag        Electronically signed by Jorden Sharma MD on 12/7/2017 at 10:28 AM

## 2017-12-07 NOTE — PROGRESS NOTES
Cardiology Progress Note      Patient:  Daniela Dixon  YOB: 1931  MRN: [id number]   Acct: [de-identified]  Admit Date:  12/5/2017  Primary Cardiologist: Mica Casillas MD  Seen by Dr. Linda Trinidad    REQUESTING PROVIDER:  Hospitalist Service.     REASON FOR CONSULTATION:  Arrhythmia, atrial fibrillation.     HISTORY OF PRESENT ILLNESS:  as prior CX note-   This is a very unfortunate 80year-old patient  who has a very extensive cardiac history who was not in a good condition to  give me a good history due to significant underlying dementia and some  chronic mental status issues. I was consulted on this lady last night  because of a wide-complex arrhythmia. The patient used to see Dr. Korina Osman;  had a history of previous bypass in 2007 and subsequently developed some  stress-induced cardiomyopathy, which was not related to her coronary artery  disease, which was treated medically. Last time she had seen Dr. Korina Osman  was in 2015. I could not see anywhere in the chart to indicate that she  has had any history of atrial fibrillation. However, this is questionable  given the fact she is not able to give me much history. She is admitted  because of what looks like some shingles and consequence to that some  lesion on the left upper back and shoulder area. During her stay, she  developed this wide-complex rhythm, which was initially thought to be  ventricular tachycardia, but then turned out to be atrial fibrillation  rapid ventricular response. The patient of course does not report a whole  lot of symptoms and it is hard to get much history. She denies any obvious  chest pain right now. She does have chronic shortness of breath, but she  is quite disabled.       Subjective (Events in last 24 hours):     Pt with fevers and developing PNA over hte past 24 hours  She has pulled out multiple IV's and has tele off    Just restarted on IV cardizem - hr AFB with RVR        Pt denies CP or SOB or dizziness mg Q6H PRN       Diagnostics:  EK-DEC-2017 03:31:11 [facility]-6K ROUTINE RETRIEVAL  Atrial fibrillation  Left bundle branch block  Abnormal ECG  When compared with ECG of 2017 12:29,  Premature atrial complexes are now Present  AZ interval has decreased  Ventricular rate has increased BY 51 BPM  T wave amplitude has increased in Anterior leads  Confirmed by KEYA CALVILLO ([id number]) on 2017 5:31:44 PM    Echo:   SUMMARY:     Left ventricle:  Size was normal.  Systolic function was normal by visual assessment. Ejection fraction was estimated in the range of 55 % to 60 %. There were no regional wall motion abnormalities. Wall thickness was mildly increased. Doppler parameters were consistent with abnormal left ventricular relaxation (grade 1 diastolic dysfunction).    Left atrium:  Size was normal.     Mitral valve: There was mild annular calcification. Valve structure was normal.  There was mild regurgitation.     Aortic valve: The valve was trileaflet. Leaflets exhibited mildly increased thickness and normal cuspal separation. There was no evidence for stenosis. There was moderate regurgitation. There was possible reversal of blood flow in the left main coronary artery. If this  is the case, the patient may indeed have severe AI.     Tricuspid valve: There was mild regurgitation.     Pericardium:  There was no pericardial effusion.     Pulmonary arteries:  Systolic pressure was within the normal range.     Aorta, systemic arteries: The root exhibited mild dilatation.     Prepared and signed by     Faustino Calloway MD  Signed 07-Aug-2015         Left Heart Cath:   2013: Stenting of PLB of LCx using Promus 2.5 X 12 mm, and Proximal LCX using Promus 3.5 X 12 mm. Done in [place]. Lab Data:    Cardiac Enzymes:  No results for input(s): CKTOTAL, CKMB, CKMBINDEX, TROPONINI in the last 72 hours.     CBC:   Lab Results   Component Value Date    WBC 7.6 2017    RBC 4.21 12/05/2017    HGB 13.4 12/05/2017    HCT 39.3 12/05/2017     12/05/2017       CMP:  Lab Results   Component Value Date     12/06/2017    K 3.7 12/06/2017     12/06/2017    CO2 23 12/06/2017    BUN 23 12/06/2017    CREATININE 0.6 12/06/2017    LABGLOM >90 12/06/2017    GLUCOSE 113 12/06/2017    CALCIUM 7.8 12/06/2017       Hepatic Function Panel:  Lab Results   Component Value Date    ALKPHOS 68 12/05/2017    ALT 18 12/05/2017    AST 24 12/05/2017    PROT 6.1 12/05/2017    BILITOT 0.4 12/05/2017    LABALBU 2.9 12/05/2017       Magnesium:    Lab Results   Component Value Date    MG 1.8 12/06/2017       PT/INR:    Lab Results   Component Value Date    INR 0.97 06/11/2017       HgBA1c:  No results found for: LABA1C    FLP:  Lab Results   Component Value Date    TRIG 100 12/06/2017    HDL 25 12/06/2017    LDLCALC 88 12/06/2017       TSH:    Lab Results   Component Value Date    TSH 1.840 12/06/2017         Assessment:    PNA  Fevers     Shingles    New afb with RVR of unknown duration   [id number] at least 3     Elevated trop: 0.017 / 0.021  Not MI   Demand ischemia from AFB / infectious process    CAD: PCI 2013    HTN  HLP    DNR CC?       Plan:    Continue rate control of AFB  Continue FS lovenox at present    Echo pending    BP control    No plans for ischemic work-up at present         Electronically signed by Debbie Gordon CNP on 12/7/2017 at 2:29 PM

## 2017-12-07 NOTE — FLOWSHEET NOTE
Pt was with her daughter at the time of the visit. Her spirit was down due to illness     12/06/17 1935   Encounter Summary   Services provided to: Patient and family together   Referral/Consult From: [address] Visiting Yes  (12/6 )   Complexity of Encounter Low   Length of Encounter 15 minutes   Routine   Type Initial   Assessment Approachable;Calm   Intervention [place];Prayer;Nurtured hope; Active listening   Outcome Acceptance;Expressed gratitude;Encouraged;Grieving; Hopeful;Receptive   Sacraments   Sacrament of Sick-Anointing Anointed    but she wanted prayer to heal and she was anointed.

## 2017-12-07 NOTE — PROGRESS NOTES
[address] [id number]  INPATIENT PHYSICAL THERAPY  EVALUATION  STRZ RENAL TELEMETRY 6K    Time In: 7527  Time Out: 8020  Timed Code Treatment Minutes: 8 Minutes  Minutes: 23          Date: 2017  Patient Name: Dayron Guthrie,  Gender:  female        MRN: [id number]  : 1931  (80 y.o.)      Referring Practitioner: Dr. Amalia Madrigal  Diagnosis: shingles  Additional Pertinent Hx: admit with above diagnosis, hx Alzheimer's dementia with behavioral disturbances at times     Past Medical History:   Diagnosis Date    Aortic insufficiency: Mild by echo 2015    CAD (coronary artery disease)     Cerebral aneurysm 8/31/10    s/p coiling OU    Chest pain     Depression     Hyperlipidemia     Hypertension     LV dysfunction     Orthostatic hypotension     S/P PTCA (percutaneous transluminal coronary angioplasty): 2013: Stenting of PLB of LCx using Promus 2.5 X 12 mm, and Proximal LCX using Promus 3.5 X 12 mm 10/1/2013    2013: Stenting of PLB of LCx using Promus 2.5 X 12 mm, and Proximal LCX using Promus 3.5 X 12 mm. Done in [place].  Serotonin syndrome     Sinus bradycardia 2014     Past Surgical History:   Procedure Laterality Date    APPENDECTOMY      BRAIN ANEURYSM SURGERY      CARDIAC SURGERY      CARDIOVASCULAR STRESS TEST  11-23-10    [facility] rhythm. Occasional PAC's and PVC's. Episodes of SVT of only few beats, possible short bursts of atrial fibrillation. No V-tach and no pauses. No significant symptoms.  CHOLECYSTECTOMY      COLONOSCOPY      CORONARY ANGIOPLASTY WITH STENT PLACEMENT  12    2 stents    CORONARY ARTERY BYPASS GRAFT      DIAGNOSTIC CARDIAC CATH LAB PROCEDURE  11/18/10    Patent JAMIL to LAD, patent vein graft to OM branch. Subtotally occluded native LAD. Moderate lesion in OM branch w/ competitive flow into vein graft. Moderate disease in codominant left circ. Mild diffuse disease in dominant RCA.  Anteroapical and inferoapical hypokinesis. EF 30-35%.  ENDOSCOPY, COLON, DIAGNOSTIC      EYE SURGERY      bilateral cataracts    HYSTERECTOMY      TONSILLECTOMY      TRANSTHORACIC ECHOCARDIOGRAM  3-01-11    LV size and systolic function normal. EF 60-65%. Grade 1 diastolic dysfunction. Mild MV annular calcification. Trivial MR. AV leaflets exhibited mildly increased thickness, mild calcification, and normal cuspal separation. Mild AR and TR. Aortic root exhibited mild dilatation.  TRANSTHORACIC ECHOCARDIOGRAM  11-17-10    LV size and systolic function normal. EF 30-35%. Apical anterior wall was hypokinetic. Grade 1 diastolic dysfunction. AV leaflets exhibited normal thickness, normal cuspal separation, and sclerosis. Trivial AR.  TRANSTHORACIC ECHOCARDIOGRAM  9-02-10    Normal LV size w/ hyperdynamic LV function. EF 65%. RV is not completely visualized. E to A reversal in MV flow pattern suggestive of diastolic dysfunction. Mild to moderate TR. RVSP 45mmHg.  VASCULAR SURGERY      cabg harvests from right leg       Restrictions/Precautions:  Restrictions/Precautions: General Precautions, Fall Risk      Subjective:  Chart Reviewed: Yes  Patient assessed for rehabilitation services?: Yes  Family / Caregiver Present: No  Subjective: pleasant and cooperative, with min encouragement agreed to amb    General:    Pain:  Denies.      Pre Treatment Pain Screening  Pain at present: 0    Social/Functional History:    Lives With: Alone  Type of Home: Assisted living  Home Layout: One level  Home Equipment: Rolling walker   Ambulation Assistance: Independent  Transfer Assistance: Independent  Additional Comments: per chart considering ECF at discharge    Objective:    RLE AROM: WFL    LLE AROM : WFL      Strength RLE: WFL  Comment: grossly 4/5 generalized weakness    Strength LLE: WFL  Comment: grossly 4/5 generalized weakness         Balance  Sitting - Static: Good  Sitting - Dynamic: Good  Standing - Static: Fair  Standing - Dynamic: Fair    Supine to Sit: Minimal assistance (with nursing)    Transfers  Sit to Stand: Contact guard assistance (bedside chair, cues for hand placement)  Stand to sit: Contact guard assistance (cues to turn fully to chair with erect posture and for hand placement for safety)       Ambulation 1  Surface: level tile  Device: Rolling Walker  Assistance: Contact guard assistance  Quality of Gait: step to gait pattern leading with RLE, flexed trunk posture, decreased step length RLE more than LLE, fatigued quickly, easily SOB  Distance: 20'x1                       Exercises:  Comments: seated BLE ankle pumps, glut set, long arc quad, marches x8-10 reps to increase strength, cues for full ROM and to slow down            Activity Tolerance:  Activity Tolerance: Patient limited by fatigue;Patient limited by endurance    Treatment Initiated: see therex    Assessment: Body structures, Functions, Activity limitations: Decreased functional mobility , Decreased balance, Decreased endurance, Decreased strength  Assessment: pt tolerated fair, increased SOB with amb, use of RW, generalized weakness and deconditioning with safety concerns, recommend cont PT to increase pt functional mobility  Prognosis: Good    Clinical Presentation: Moderate - Evolving with Changing Characteristics:    increased SOB with amb, use of RW, generalized weakness and deconditioning with safety concerns, recommend cont PT    Decision Making: High Complexitybased on patient assessment and decision making process of determining plan of care and establishing reasonable expectations for measurable functional outcomes    REQUIRES PT FOLLOW UP: Yes  Discharge Recommendations: Continue to assess pending progress, Patient would benefit from continued therapy after discharge, 24 hour supervision or assist    Patient Education:  Patient Education: POC    Equipment Recommendations:  Equipment Needed: No    Safety:  Type of devices:  All fall risk precautions in place, Call light within reach, Nurse notified, Patient at risk for falls, Gait belt, Left in chair, Chair alarm in place    Plan:  Times per week: 3-5X GM  Times per day: Daily  Specific instructions for Next Treatment: therex and mobility  Current Treatment Recommendations: Strengthening, Functional Mobility Training, Equipment Evaluation, Education, & procurement, Home Exercise Program, Safety Education & Training, Patient/Caregiver Education & Training, Stair training, Gait Training, Transfer Training, Balance Training, Endurance Training    Goals:  Patient goals : go home  Short term goals  Time Frame for Short term goals: 2 weeks  Short term goal 1: bed mobility with SBA to get in/out of bed  Short term goal 2: transfer with SBA to get in/out of chairs  Short term goal 3: amb 75'x1 with walker and SBA to walk safely in home  Long term goals  Time Frame for Long term goals : no LTGs set secondary to short ELOS    Evaluation Complexity: Based on the findings of patient history, examination, clinical presentation, and decision making during this evaluation, the evaluation of Ki Brown  is of medium complexity. PT G-Codes  Functional Limitation: Mobility: Walking and moving around  Mobility: Walking and Moving Around Current Status (): At least 40 percent but less than 60 percent impaired, limited or restricted  Mobility: Walking and Moving Around Goal Status ():  At least 20 percent but less than 40 percent impaired, limited or restricted       AM-PAC Inpatient Mobility without Stair Climbing Raw Score : 15  AM-PAC Inpatient without Stair Climbing T-Scale Score : 43.03  Mobility Inpatient CMS 0-100% Score: 47.43  Mobility Inpatient without Stair CMS G-Code Modifier : CK

## 2017-12-08 ENCOUNTER — APPOINTMENT (OUTPATIENT)
Dept: GENERAL RADIOLOGY | Age: 82
DRG: 595 | End: 2017-12-08
Payer: MEDICARE

## 2017-12-08 LAB
ANION GAP SERPL CALCULATED.3IONS-SCNC: 12 MEQ/L (ref 8–16)
BUN BLDV-MCNC: 18 MG/DL (ref 7–22)
CALCIUM SERPL-MCNC: 8.1 MG/DL (ref 8.5–10.5)
CHLORIDE BLD-SCNC: 103 MEQ/L (ref 98–111)
CO2: 22 MEQ/L (ref 23–33)
CREAT SERPL-MCNC: 0.7 MG/DL (ref 0.4–1.2)
DIGOXIN LEVEL: 0.8 NG/ML (ref 0.5–2)
EKG ATRIAL RATE: 131 BPM
EKG Q-T INTERVAL: 434 MS
EKG QRS DURATION: 134 MS
EKG QTC CALCULATION (BAZETT): 509 MS
EKG R AXIS: 18 DEGREES
EKG T AXIS: -177 DEGREES
EKG VENTRICULAR RATE: 83 BPM
GFR SERPL CREATININE-BSD FRML MDRD: 79 ML/MIN/1.73M2
GLUCOSE BLD-MCNC: 127 MG/DL (ref 70–108)
POTASSIUM SERPL-SCNC: 3.9 MEQ/L (ref 3.5–5.2)
SODIUM BLD-SCNC: 137 MEQ/L (ref 135–145)

## 2017-12-08 PROCEDURE — 80048 BASIC METABOLIC PNL TOTAL CA: CPT

## 2017-12-08 PROCEDURE — 97110 THERAPEUTIC EXERCISES: CPT

## 2017-12-08 PROCEDURE — 1200000003 HC TELEMETRY R&B

## 2017-12-08 PROCEDURE — 6370000000 HC RX 637 (ALT 250 FOR IP): Performed by: INTERNAL MEDICINE

## 2017-12-08 PROCEDURE — 2580000003 HC RX 258: Performed by: INTERNAL MEDICINE

## 2017-12-08 PROCEDURE — 93005 ELECTROCARDIOGRAM TRACING: CPT

## 2017-12-08 PROCEDURE — 2580000003 HC RX 258: Performed by: NURSE PRACTITIONER

## 2017-12-08 PROCEDURE — G8988 SELF CARE GOAL STATUS: HCPCS

## 2017-12-08 PROCEDURE — 99233 SBSQ HOSP IP/OBS HIGH 50: CPT | Performed by: INTERNAL MEDICINE

## 2017-12-08 PROCEDURE — 97530 THERAPEUTIC ACTIVITIES: CPT

## 2017-12-08 PROCEDURE — 6360000002 HC RX W HCPCS: Performed by: INTERNAL MEDICINE

## 2017-12-08 PROCEDURE — 71010 XR CHEST PORTABLE: CPT

## 2017-12-08 PROCEDURE — 36415 COLL VENOUS BLD VENIPUNCTURE: CPT

## 2017-12-08 PROCEDURE — 99231 SBSQ HOSP IP/OBS SF/LOW 25: CPT | Performed by: NURSE PRACTITIONER

## 2017-12-08 PROCEDURE — 94640 AIRWAY INHALATION TREATMENT: CPT

## 2017-12-08 PROCEDURE — 6370000000 HC RX 637 (ALT 250 FOR IP): Performed by: NURSE PRACTITIONER

## 2017-12-08 PROCEDURE — 80162 ASSAY OF DIGOXIN TOTAL: CPT

## 2017-12-08 PROCEDURE — G8987 SELF CARE CURRENT STATUS: HCPCS

## 2017-12-08 RX ORDER — POTASSIUM CHLORIDE 20 MEQ/1
20 TABLET, EXTENDED RELEASE ORAL DAILY
Status: DISCONTINUED | OUTPATIENT
Start: 2017-12-08 | End: 2017-12-10 | Stop reason: HOSPADM

## 2017-12-08 RX ORDER — BUMETANIDE 1 MG/1
1 TABLET ORAL DAILY
Status: DISCONTINUED | OUTPATIENT
Start: 2017-12-08 | End: 2017-12-10 | Stop reason: HOSPADM

## 2017-12-08 RX ORDER — DILTIAZEM HYDROCHLORIDE 60 MG/1
60 TABLET, FILM COATED ORAL EVERY 6 HOURS SCHEDULED
Status: DISCONTINUED | OUTPATIENT
Start: 2017-12-08 | End: 2017-12-09

## 2017-12-08 RX ADMIN — DILTIAZEM HYDROCHLORIDE 60 MG: 60 TABLET, FILM COATED ORAL at 17:02

## 2017-12-08 RX ADMIN — GABAPENTIN 100 MG: 100 CAPSULE ORAL at 14:36

## 2017-12-08 RX ADMIN — IPRATROPIUM BROMIDE AND ALBUTEROL SULFATE 1 AMPULE: .5; 3 SOLUTION RESPIRATORY (INHALATION) at 16:02

## 2017-12-08 RX ADMIN — CEFEPIME HYDROCHLORIDE 2 G: 2 INJECTION, POWDER, FOR SOLUTION INTRAVENOUS at 04:20

## 2017-12-08 RX ADMIN — MEMANTINE HYDROCHLORIDE 10 MG: 10 TABLET, FILM COATED ORAL at 21:49

## 2017-12-08 RX ADMIN — DIPHENHYDRAMINE HCL 25 MG: 25 TABLET ORAL at 06:43

## 2017-12-08 RX ADMIN — GABAPENTIN 100 MG: 100 CAPSULE ORAL at 21:49

## 2017-12-08 RX ADMIN — IPRATROPIUM BROMIDE AND ALBUTEROL SULFATE 1 AMPULE: .5; 3 SOLUTION RESPIRATORY (INHALATION) at 20:40

## 2017-12-08 RX ADMIN — HYDROCODONE BITARTRATE AND ACETAMINOPHEN 1 TABLET: 5; 325 TABLET ORAL at 23:33

## 2017-12-08 RX ADMIN — IPRATROPIUM BROMIDE AND ALBUTEROL SULFATE 1 AMPULE: .5; 3 SOLUTION RESPIRATORY (INHALATION) at 12:15

## 2017-12-08 RX ADMIN — Medication 10 ML: at 21:58

## 2017-12-08 RX ADMIN — DONEPEZIL HYDROCHLORIDE 22.5 MG: 10 TABLET, FILM COATED ORAL at 21:50

## 2017-12-08 RX ADMIN — DIPHENHYDRAMINE HCL 25 MG: 25 TABLET ORAL at 21:49

## 2017-12-08 RX ADMIN — DILTIAZEM HYDROCHLORIDE 60 MG: 60 TABLET, FILM COATED ORAL at 23:37

## 2017-12-08 RX ADMIN — DILTIAZEM HYDROCHLORIDE 60 MG: 60 TABLET, FILM COATED ORAL at 06:43

## 2017-12-08 ASSESSMENT — PAIN SCALES - WONG BAKER: WONGBAKER_NUMERICALRESPONSE: 8

## 2017-12-08 ASSESSMENT — PAIN DESCRIPTION - FREQUENCY
FREQUENCY: CONTINUOUS

## 2017-12-08 ASSESSMENT — PAIN DESCRIPTION - DESCRIPTORS
DESCRIPTORS: TENDER

## 2017-12-08 ASSESSMENT — PAIN DESCRIPTION - LOCATION
LOCATION: NECK

## 2017-12-08 ASSESSMENT — PAIN SCALES - GENERAL
PAINLEVEL_OUTOF10: 8
PAINLEVEL_OUTOF10: 7
PAINLEVEL_OUTOF10: 10
PAINLEVEL_OUTOF10: 6

## 2017-12-08 ASSESSMENT — PAIN DESCRIPTION - ORIENTATION
ORIENTATION: POSTERIOR

## 2017-12-08 ASSESSMENT — PAIN DESCRIPTION - PROGRESSION: CLINICAL_PROGRESSION: NOT CHANGED

## 2017-12-08 ASSESSMENT — PAIN DESCRIPTION - PAIN TYPE: TYPE: CHRONIC PAIN

## 2017-12-08 NOTE — PROGRESS NOTES
onto bed)    Transfers  Sit to Stand: Contact guard assistance (from various seated surfaces, cues for hand placement)  Stand to sit: Contact guard assistance (to various seated surfaces, cues to be square to surface before sitting, cues for hand placement)       Ambulation 1  Surface: level tile  Device: Rolling Walker  Assistance: Contact guard assistance  Quality of Gait: short step length bilaterally, forward flexed posture, some SOB, impulsive to get to bathroom  Distance: 15ft x2      Exercises:  Exercises  Comments: Completed seated BLE exercises: heel/toe raises, marches, long arc quads x10 reps to increase strength for improved functional mobility. Cues to slow down. Activity Tolerance:  Activity Tolerance: Patient limited by fatigue;Patient limited by endurance    Assessment: Body structures, Functions, Activity limitations: Decreased functional mobility , Decreased balance, Decreased endurance, Decreased strength  Assessment: Patient tolerated session fair. Patient impulsive to get to bathroom and get a new gown. Patient uncomfortable due to shingles. Patient would benefit from continued skilled physical therapy to improve functional mobility. Prognosis: Good  REQUIRES PT FOLLOW UP: Yes  Discharge Recommendations: Continue to assess pending progress, Patient would benefit from continued therapy after discharge, 24 hour supervision or assist    Patient Education:  Patient Education: therex, transfers, bed mobility, gait    Equipment Recommendations:  Equipment Needed: No    Safety:  Type of devices:  All fall risk precautions in place, Bed alarm in place, Call light within reach, Gait belt, Patient at risk for falls, Left in bed    Plan:  Times per week: 3-5X GM  Times per day: Daily  Specific instructions for Next Treatment: therex and mobility  Current Treatment Recommendations: Strengthening, Functional Mobility Training, Equipment Evaluation, Education, & procurement, Home Exercise Program, Safety Education & Training, Patient/Caregiver Education & Training, Stair training, Gait Training, Transfer Training, Balance Training, Endurance Training    Goals:  Patient goals : go home    Short term goals  Time Frame for Short term goals: 2 weeks  Short term goal 1: bed mobility with SBA to get in/out of bed  Short term goal 2: transfer with SBA to get in/out of chairs  Short term goal 3: amb 75'x1 with walker and SBA to walk safely in home    Long term goals  Time Frame for Long term goals : no LTGs set secondary to short ELOS            AM-PAC Inpatient Mobility without Stair Climbing Raw Score : 15  AM-PAC Inpatient without Stair Climbing T-Scale Score : 43.03  Mobility Inpatient CMS 0-100% Score: 47.43  Mobility Inpatient without Stair CMS G-Code Modifier : CK

## 2017-12-08 NOTE — PROGRESS NOTES
[address]  INPATIENT OCCUPATIONAL THERAPY  [facility] RENAL TELEMETRY 6K  EVALUATION    Time:  Time In: 7177  Time Out: 3868  Timed Code Treatment Minutes: 23 Minutes  Minutes: 38          Date: 2017  Patient Name: Sumi Chicas,   Gender: female      MRN: [id number]  : 1931  (80 y.o.)  Referring Practitioner: Itzel Holden. Caryle Murdoch, MD  Diagnosis: Shingles  Additional Pertinent Hx: Pt presents to the Emergency Department for the evaluation of a rash on 17. Power of  (daughter) states that the patient has had shingles for 3 weeks and it is not getting better. Patient has a history of CAD, hypertension, cerebral aneurysm s/p coiling, CABG, and Alzheimers. Restrictions/Precautions:  Restrictions/Precautions: General Precautions, Fall Risk, Isolation            Position Activity Restriction  Other position/activity restrictions: Shingles Isolation         Past Medical History:   Diagnosis Date    Aortic insufficiency: Mild by echo 2015    CAD (coronary artery disease)     Cerebral aneurysm 8/31/10    s/p coiling OU    Chest pain     Depression     Hyperlipidemia     Hypertension     LV dysfunction     Orthostatic hypotension     S/P PTCA (percutaneous transluminal coronary angioplasty): 2013: Stenting of PLB of LCx using Promus 2.5 X 12 mm, and Proximal LCX using Promus 3.5 X 12 mm 10/1/2013    2013: Stenting of PLB of LCx using Promus 2.5 X 12 mm, and Proximal LCX using Promus 3.5 X 12 mm. Done in [place].  Serotonin syndrome     Sinus bradycardia 2014     Past Surgical History:   Procedure Laterality Date    APPENDECTOMY      BRAIN ANEURYSM SURGERY      CARDIAC SURGERY      CARDIOVASCULAR STRESS TEST  11-23-10    [facility] rhythm. Occasional PAC's and PVC's. Episodes of SVT of only few beats, possible short bursts of atrial fibrillation. No V-tach and no pauses. No significant symptoms.     CHOLECYSTECTOMY      COLONOSCOPY      CORONARY ANGIOPLASTY WITH STENT PLACEMENT  7-13-12    2 stents    CORONARY ARTERY BYPASS GRAFT  2007    DIAGNOSTIC CARDIAC CATH LAB PROCEDURE  11/18/10    Patent JAMIL to LAD, patent vein graft to OM branch. Subtotally occluded native LAD. Moderate lesion in OM branch w/ competitive flow into vein graft. Moderate disease in codominant left circ. Mild diffuse disease in dominant RCA. Anteroapical and inferoapical hypokinesis. EF 30-35%.  ENDOSCOPY, COLON, DIAGNOSTIC      EYE SURGERY      bilateral cataracts    HYSTERECTOMY      TONSILLECTOMY      TRANSTHORACIC ECHOCARDIOGRAM  3-01-11    LV size and systolic function normal. EF 60-65%. Grade 1 diastolic dysfunction. Mild MV annular calcification. Trivial MR. AV leaflets exhibited mildly increased thickness, mild calcification, and normal cuspal separation. Mild AR and TR. Aortic root exhibited mild dilatation.  TRANSTHORACIC ECHOCARDIOGRAM  11-17-10    LV size and systolic function normal. EF 30-35%. Apical anterior wall was hypokinetic. Grade 1 diastolic dysfunction. AV leaflets exhibited normal thickness, normal cuspal separation, and sclerosis. Trivial AR.  TRANSTHORACIC ECHOCARDIOGRAM  9-02-10    Normal LV size w/ hyperdynamic LV function. EF 65%. RV is not completely visualized. E to A reversal in MV flow pattern suggestive of diastolic dysfunction. Mild to moderate TR. RVSP 45mmHg.  VASCULAR SURGERY      cabg harvests from right leg           Subjective  Chart Reviewed: Yes (Internal medicine note; PT evaluation; order review)  Patient assessed for rehabilitation services?: Yes  Response to previous treatment: Patient with no complaints from previous session  Family / Caregiver Present: No    Subjective: Pleasant and cooperative  Comments: Pt asked to go for a walk. She agreed to remain in the chair following session. She C/O pain and tenderness to the touch. Pt has an open blister from shingles at the base of her neck.     General:  Overall used large print puzzle books     Overall Cognitive Status: Exceptions  Attention Span: Difficulty dividing attention  Memory: Decreased short term memory, Decreased recall of recent events  Safety Judgement: Decreased awareness of need for safety  Problem Solving: Assistance required to generate solutions, Assistance required to implement solutions, Assistance required to correct errors made, Assistance required to identify errors made  Insights: Decreased awareness of deficits         Sensation  Overall Sensation Status: WFL    Posture: Fair (rounded shoulders and forward neck )  Observation: Pt has an open blister from shingles at the base of her neck. Observation/Palpation  Posture: Fair (rounded shoulders and forward neck )  Observation: Pt has an open blister from shingles at the base of her neck.     Hand Dominance: Right    LUE PROM (degrees)  LUE PROM: WNL       LUE AROM (degrees)  LUE AROM : WNL  Left Hand PROM (degrees)  Left Hand PROM: WNL  Left Hand AROM (degrees)  Left Hand AROM: WNL    RUE PROM (degrees)  RUE PROM: WNL  RUE AROM (degrees)  RUE AROM : WNL  Right Hand PROM (degrees)  Right Hand PROM: WNL  Right Hand AROM (degrees)  Right Hand AROM: WNL    LUE Strength  L Hand Grasp: 4/5  LUE Strength Comment: 3+/5 deltoid; 3+/5 pectoral; 4/5 biceps/triceps                RUE Strength  R Hand Grasp: 4/5  RUE Strength Comment: 3+/5 deltoid; 3+/5 pectoral; 4/5 biceps/triceps                             Movements Are Fluid And Coordinated: Yes     Fine Motor Skills  Fine Motor Comment: No difficulty noted with using her hands for holding onto a sheet and opening it; she also used her R hand for doing a word search puzzle         ADL  LE Dressing: Maximum assistance (Help provided for donning her slip on shoes and her socks while at edge of bed)     Bed mobility  Scooting: Stand by assistance (using a bedrail)    Transfers  Sit to stand: Contact guard assistance (from the edge of bed)  Stand to sit: Contact guard assistance (to the chair with cues provided to get squared up before sitting)    Balance  Sitting Balance: Stand by assistance  Standing Balance: Contact guard assistance (having help with preparing to walk in the hallway.)     Time: 30 seconds  Activity: preparing to walk in the hallway     Functional Mobility  Functional - Mobility Device: Rolling Walker  Assist Level: Contact guard assistance  Functional Mobility Comments: Walked 45 ft with cues needed to straighten up. She had a slow but steady pace. No LOB. Activity Tolerance:  Activity Tolerance: Patient limited by fatigue, Patient limited by pain  Activity Tolerance: Mild SOB noted throughout session and increased SOB noted while walking. She needed several minutes of sitting rest break following the walk before she could help with self care. She had pain which was severe and her nurse was notified that she needed a pain medication if she was able to have one. Treatment Initiated:  Pt was incontinent of urine and needed Mod A for changing her Depends. She wiped her perineum with a wet washcloth after it had been given to her. She needed CGA for standing balance. She had stood for 2.5 minute duration while doing the ADL. She had SOB with standing task. She helped with opening up a sheet while in supine. She needed cues to focus on task. She was handed her puzzle book and she was able to locate a page which she had yet to finish. Her nurse was notified of pt's request for a pain med. Pt was reclined in the chair at the end of session. Her pain was still present and pt occasionally indicated she was feeling pain while at rest in the chair. She also was attempting to do her word search. Assessment:  Assessment: Pt would benefit from continued skilled OT services to address above deficits. She presents with decreased endurance and functional mobility.   She had severe pain in her neck area from the shingles. She needed help with doing her ADLs and also was able to walk with assistance using the rolling walker. Pt was able to walk short distances on her own PTA. She also did some of her self care independently PTA. She was needing cues for not trying to do too much so she remains safe. She had some forgetfulness regarding her hospitalization and duration- she is at her baseline for cognition although she is more distracted from the pain at this time. Performance deficits / Impairments: Decreased ADL status, Decreased functional mobility , Decreased endurance, Decreased cognition, Decreased safe awareness  Prognosis: Fair  Discharge Recommendations: [address] with OT    Clinical Decision Making: Clinical Decision making was of Moderate Complexity as the result of analysis of data from a detailed assessment, a consideration of several treatment options, the presence of comorbidities affecting the plan of care and the need for minimal to moderate modifications or assistance required to complete the evaluation. Patient Education:  Patient Education: OT POC; pt's goal; safety awareness while walking in the hallway; orientation to situation and purpose of therapy    Equipment Recommendations:  Equipment Needed: No    Safety:  Safety Devices in place: Yes  Type of devices: Left in chair, Nurse notified, Chair alarm in place, Gait belt, Patient at risk for falls, Call light within reach    Plan:  Times per week: 3-5x  Current Treatment Recommendations: Functional Mobility Training, Endurance Training, Self-Care / ADL, Safety Education & Training, Strengthening  Plan Comment: Pt would benefit from continued OT at [facility].   Specific instructions for Next Treatment: Functional mobility; ADLs and safety awareness; upper body exercises and relaxed breathing techniques    Goals:  Patient goals : \"I want to get rid of this pain and be able to do things easier. \" pt states. Short term goals  Time Frame for Short term goals: 1 week  Short term goal 1: Pt will demonstrate functional mobility walking to/from the bathroom with a rolling walker and SBA to prepare for doing sinkside grooming. Short term goal 2: Pt will complete transfers to/from various surfaces including a standard toilet seat with a grabbar with SBA to increase her independence with toileting routine. Short term goal 3: Pt will complete simple ADLs while standing for over 3 minute duration to increase her endurance for ease of showering or dressing herself. Short term goal 4: Pt will complete upper body exercises while following relaxed breathing technique with demonstration as needed to increase her activity tolerance and assist pain control for ease of doing ADLs. Long term goals  Time Frame for Long term goals : None secondary to short estimated length of stay. Evaluation Complexity: Based on the findings of patient history, examination, clinical presentation, and decision making during this evaluation, this patient is of medium complexity.     AM-PAC Inpatient Daily Activity Raw Score: 17  AM-PAC Inpatient ADL T-Scale Score : 37.26  ADL Inpatient CMS 0-100% Score: 50.11  ADL Inpatient CMS G-Code Modifier : CK

## 2017-12-08 NOTE — PROGRESS NOTES
has been created using voice recognition software. It may contain minor errors which are inherent in voice recognition technology. **      Final report electronically signed by Dr. Anna Coleman on 12/8/2017 5:27 AM      XR Chest Portable   Final Result   1. Mild cardiac megaly. Metallic sternotomy sutures. No effusion is seen. 2. Moderate atelectasis/pneumonia right mid and lower lung fields. Mild infiltrate left lung base. These infiltrates were not present on the prior study. **This report has been created using voice recognition software. It may contain minor errors which are inherent in voice recognition technology. **      Final report electronically signed by Dr. Julius Yoon on 12/7/2017 1:41 PM      XR CHEST STANDARD (2 VW)   Final Result   Cardiomegaly. No other acute findings            **This report has been created using voice recognition software. It may contain minor errors which are inherent in voice recognition technology. **      Final report electronically signed by Dr. Stew Wilder on 12/5/2017 8:00 PM          Diet: Dietary Nutrition Supplements: Low Calorie High Protein Supplement  DIET CARDIAC;  Dental Soft    DVT prophylaxis: [x] Lovenox                                 [] SCDs                                 [] SQ Heparin                                 [] Encourage ambulation           [] Already on Anticoagulation     Disposition:    [] Home       [] TCU       [] Rehab       [] Psych       [x] SNF       [] [place]       [] Other-    Code Status: DNR-CC        Assessment/Plan:    Anticipated Discharge in : ?    Active Hospital Problems    Diagnosis Date Noted    New onset atrial fibrillation [facility]) [I48.91]      Priority: High    Atrial fibrillation with RVR ([address] [I48.91]      Priority: High    Wide-complex tachycardia (HCC) [I47.2]      Priority: High    Coronary artery disease involving coronary bypass graft of native heart without angina pectoris [I25.810] Priority: High    Elevated troponin [R74.8] 12/06/2017    Elevated lactic acid level [R79.89] 12/06/2017    Alzheimer's dementia with behavioral disturbance [G30.8, F02.81] 12/06/2017    Cellulitis of neck [L03.221] 12/06/2017    Paroxysmal atrial fibrillation ([facility] [address] [I48.0] 12/06/2017    Shingles [B02.9] 12/05/2017    History of cerebral aneurysm repair [E91.164, Z86.79]     Depression [F32.9]     Essential hypertension [I10] 01/18/2012    Dyslipidemia [E78.5] 01/18/2012    Hx of CABG [Z95.1] 01/18/2012    CAD (coronary artery disease) [I25.10]        1. Persistent shingle with ? postshingles neuritis- try neurontin  2. Afib/rvr- ck mg, added dilitiazem drip; consulted Cardio; will boost lopressor and add digoxin; lovenox for now for anticoag; boost diltiazem  3. chf- will do echo, keep on diuretic.  For now        Electronically signed by Ashley Cruz MD on 12/8/2017 at 5:32 AM

## 2017-12-08 NOTE — PROGRESS NOTES
Cardiology Progress Note      Patient:  Annette Muir  YOB: 1931  MRN: [id number]   Acct: [de-identified]  Admit Date:  12/5/2017  Primary Cardiologist: Pérez Woodall MD  Seen by Dr. Katie Tee    REQUESTING PROVIDER:  Hospitalist Service.     REASON FOR CONSULTATION:  Arrhythmia, atrial fibrillation.     HISTORY OF PRESENT ILLNESS:  as prior CX note-   This is a very unfortunate 80year-old patient  who has a very extensive cardiac history who was not in a good condition to  give me a good history due to significant underlying dementia and some  chronic mental status issues. I was consulted on this lady last night  because of a wide-complex arrhythmia. The patient used to see Dr. Isis Escalante;  had a history of previous bypass in 2007 and subsequently developed some  stress-induced cardiomyopathy, which was not related to her coronary artery  disease, which was treated medically. Last time she had seen Dr. Isis Escalante  was in 2015. I could not see anywhere in the chart to indicate that she  has had any history of atrial fibrillation. However, this is questionable  given the fact she is not able to give me much history. She is admitted  because of what looks like some shingles and consequence to that some  lesion on the left upper back and shoulder area. During her stay, she  developed this wide-complex rhythm, which was initially thought to be  ventricular tachycardia, but then turned out to be atrial fibrillation  rapid ventricular response. The patient of course does not report a whole  lot of symptoms and it is hard to get much history. She denies any obvious  chest pain right now. She does have chronic shortness of breath, but she  is quite disabled.       Subjective (Events in last 24 hours):     Pt with fevers and developing PNA over hte past 24 hours  She has pulled out multiple IV's and has tele off    Just restarted on IV cardizem - hr AFB with RVR        Pt denies CP or SOB or dizziness She is quite sleepy sitting up in her chair    BP severely elevated      12/8/2017  Pt awake -   Daughter at bedside    Pt has refused pills this am - she states she couldn't swallow them - they were too big - she agrees to crushing and putting in applesauce or pudding    No cardiac issues  Agree needs diuresed    On chart say [facility]  Discussed with daughter - sounds like DNR CCAx4      Objective:   /68   Pulse 77   Temp 98.3 °F (36.8 °C) (Oral)   Resp 19   Ht 5' 2\" (1.575 m)   Wt 178 lb 11.2 oz (81.1 kg)   SpO2 94%   BMI 32.68 kg/m²        TELEMETRY: pt wont wear tele     Physical Exam:  General Appearance: alert - opens eye - confused -  in no acute distress  Cardiovascular: irregular rhythm, normal S1 and S2, no murmurs, rubs, clicks, or gallops, distal pulses intact,   Pulmonary/Chest: diminshed to auscultation bilaterally- no wheezes, rales or rhonchi, normal air movement, no respiratory distress  Abdomen: soft, non-tender, non-distended, normal bowel sounds, no masses Extremities: no cyanosis, clubbing or edema, pulses present   Skin: warm and dry, no rash or erythema  Head: normocephalic and atraumatic  Musculoskeletal: normal range of motion, no joint swelling, deformity or tenderness  Neurological: lethargic, no focal findings or movement disorder noted    Medications:    diltiazem  60 mg Oral 4 times per day    bumetanide  1 mg Oral Daily    potassium chloride  20 mEq Oral Daily    metoprolol tartrate  100 mg Oral BID    enoxaparin  1 mg/kg Subcutaneous Q12H    ipratropium-albuterol  1 ampule Inhalation Q4H While awake    aspirin  81 mg Oral Every Other Day    escitalopram  5 mg Oral Daily    sodium chloride flush  10 mL Intravenous 2 times per day    donepezil  22.5 mg Oral Nightly    memantine  10 mg Oral BID    metoprolol  5 mg Intravenous Once    magnesium replacement protocol   Other RX Placeholder    potassium replacement protocol   Other RX Placeholder    lactobacillus 07-Aug-2015         Left Heart Cath:   7/13/2013: Stenting of PLB of LCx using Promus 2.5 X 12 mm, and Proximal LCX using Promus 3.5 X 12 mm. Done in [place]. Lab Data:    Cardiac Enzymes:  No results for input(s): CKTOTAL, CKMB, CKMBINDEX, TROPONINI in the last 72 hours. CBC:   Lab Results   Component Value Date    WBC 8.6 12/07/2017    RBC 4.17 12/07/2017    HGB 13.0 12/07/2017    HCT 38.2 12/07/2017     12/07/2017       CMP:    Lab Results   Component Value Date     12/08/2017    K 3.9 12/08/2017     12/08/2017    CO2 22 12/08/2017    BUN 18 12/08/2017    CREATININE 0.7 12/08/2017    LABGLOM 79 12/08/2017    GLUCOSE 127 12/08/2017    CALCIUM 8.1 12/08/2017       Hepatic Function Panel:    Lab Results   Component Value Date    ALKPHOS 68 12/05/2017    ALT 18 12/05/2017    AST 24 12/05/2017    PROT 6.1 12/05/2017    BILITOT 0.4 12/05/2017    LABALBU 2.9 12/05/2017       Magnesium:    Lab Results   Component Value Date    MG 1.8 12/06/2017       PT/INR:    Lab Results   Component Value Date    INR 0.97 06/11/2017       HgBA1c:  No results found for: LABA1C    FLP:    Lab Results   Component Value Date    TRIG 100 12/06/2017    HDL 25 12/06/2017    LDLCALC 88 12/06/2017       TSH:    Lab Results   Component Value Date    TSH 1.840 12/06/2017         Assessment:    PNA  Fevers? Acute diastolic chf secondary to AFB RVR / HTN    Shingles    New afb with RVR of unknown duration   [id number] at least 3     Elevated trop: 0.017 / 0.021  Not MI   Demand ischemia from AFB / infectious process    CAD: PCI 2013    HTN  HLP    DNR CC?   Discussed with family- they want CCA x4        Plan:    Continue rate control of AFB  Continue FS lovenox at present- she isnt a good candidate for long term [address] DT confusion, advanced age and bleeding risk      Echo pending    BP control    No plans for ischemic work-up at present         Electronically signed by Mayi Ramires CNP on 12/8/2017 at 3:41 PM

## 2017-12-08 NOTE — PLAN OF CARE
Problem: Falls - Risk of  Goal: Absence of falls  Outcome: Ongoing  No falls so far this shift, call light and bedside table within reach. Bed in lowest position and alarm on, nonskid socks on bilaterally. Problem: Risk for Impaired Skin Integrity  Goal: Tissue integrity - skin and mucous membranes  Structural intactness and normal physiological function of skin and  mucous membranes. Outcome: Ongoing  Pt has multiple sites of shingles break out and bruising scattered throughout. Will continue to encourage frequent turning and movement. Mucous membranes pink and moist.     Problem: Pain Control  Goal: Maintain pain level at or below patient's acceptable level (or 5 if patient is unable to determine acceptable level)  Outcome: Ongoing  Pt complained of pain at beginning of shift due to shingles, will continue to assess using 0-10 pain scale. Problem: Neurological  Goal: Maximum potential motor/sensory/cognitive function  Outcome: Ongoing  Pt was alert and oriented to person, place, time. Will continue to assess. Problem: Cardiovascular  Goal: No DVT, peripheral vascular complications  Outcome: Ongoing  No signs or symptoms of DVT at this time, will continue to assess. Goal: Hemodynamic stability  Outcome: Ongoing  Pt remains hemodynamically stable at this time, continue to assess. Problem: Respiratory  Goal: No pulmonary complications  Outcome: Ongoing  No signs or symptoms of pulmonary distress at this time, pulse ox remains >90% on room air. Continue to assess. Problem: Nutrition  Goal: Optimal nutrition therapy  Outcome: Ongoing  Nutritional intake appears adequate at this time, pt has ensure on board. No complaints of decreased appetite. Will continue to assess intake and output and encourage nutritional intake.      Problem: DISCHARGE BARRIERS  Goal: Patient's continuum of care needs are met  Outcome: Ongoing  Pt needs are met at this time, will continue to assist as needed in preparation for discharge. Problem: Musculor/Skeletal Functional Status  Goal: Highest potential functional level  Outcome: Ongoing  Pt ambulates well with assistance and use of walker. Will continue to encourage frequent ambulation as tolerated. Comments: Care plan reviewed with patient and daughters. Patient and daughters verbalize understanding of the plan of care and contribute to goal setting.

## 2017-12-08 NOTE — CARE COORDINATION
Update:   Core Measure:  CHF Teaching Upon Discharge, on BB, ECHO pending  Pneumonia-met, [employer] Education given to [facility] [employer], [employer]; new ECF, SW following, collaborated with nursing  12/08/17  11:59 AM

## 2017-12-09 VITALS
DIASTOLIC BLOOD PRESSURE: 82 MMHG | BODY MASS INDEX: 32.76 KG/M2 | SYSTOLIC BLOOD PRESSURE: 141 MMHG | RESPIRATION RATE: 18 BRPM | HEIGHT: 62 IN | HEART RATE: 72 BPM | WEIGHT: 178 LBS | TEMPERATURE: 98.3 F | OXYGEN SATURATION: 98 %

## 2017-12-09 LAB
ANION GAP SERPL CALCULATED.3IONS-SCNC: 13 MEQ/L (ref 8–16)
BUN BLDV-MCNC: 17 MG/DL (ref 7–22)
CALCIUM SERPL-MCNC: 8.1 MG/DL (ref 8.5–10.5)
CHLORIDE BLD-SCNC: 106 MEQ/L (ref 98–111)
CO2: 23 MEQ/L (ref 23–33)
CREAT SERPL-MCNC: 0.7 MG/DL (ref 0.4–1.2)
GFR SERPL CREATININE-BSD FRML MDRD: 79 ML/MIN/1.73M2
GLUCOSE BLD-MCNC: 130 MG/DL (ref 70–108)
POTASSIUM SERPL-SCNC: 3.5 MEQ/L (ref 3.5–5.2)
SODIUM BLD-SCNC: 142 MEQ/L (ref 135–145)

## 2017-12-09 PROCEDURE — 6370000000 HC RX 637 (ALT 250 FOR IP): Performed by: INTERNAL MEDICINE

## 2017-12-09 PROCEDURE — 99231 SBSQ HOSP IP/OBS SF/LOW 25: CPT | Performed by: NURSE PRACTITIONER

## 2017-12-09 PROCEDURE — 94640 AIRWAY INHALATION TREATMENT: CPT

## 2017-12-09 PROCEDURE — 80048 BASIC METABOLIC PNL TOTAL CA: CPT

## 2017-12-09 PROCEDURE — 99239 HOSP IP/OBS DSCHRG MGMT >30: CPT | Performed by: INTERNAL MEDICINE

## 2017-12-09 PROCEDURE — 36415 COLL VENOUS BLD VENIPUNCTURE: CPT

## 2017-12-09 RX ORDER — POTASSIUM CHLORIDE 20 MEQ/1
20 TABLET, EXTENDED RELEASE ORAL DAILY
Qty: 60 TABLET | Refills: 3 | DISCHARGE
Start: 2017-12-10

## 2017-12-09 RX ORDER — DILTIAZEM HYDROCHLORIDE 240 MG/1
240 CAPSULE, COATED, EXTENDED RELEASE ORAL DAILY
Qty: 30 CAPSULE | Refills: 3 | DISCHARGE
Start: 2017-12-10

## 2017-12-09 RX ORDER — GABAPENTIN 100 MG/1
100 CAPSULE ORAL 3 TIMES DAILY
Qty: 90 CAPSULE | Refills: 3 | Status: SHIPPED | OUTPATIENT
Start: 2017-12-09

## 2017-12-09 RX ORDER — DIGOXIN 125 MCG
125 TABLET ORAL DAILY
Qty: 30 TABLET | Refills: 3 | DISCHARGE
Start: 2017-12-10

## 2017-12-09 RX ORDER — PSEUDOEPHEDRINE HCL 30 MG
100 TABLET ORAL 2 TIMES DAILY PRN
DISCHARGE
Start: 2017-12-09

## 2017-12-09 RX ORDER — DILTIAZEM HYDROCHLORIDE 240 MG/1
240 CAPSULE, COATED, EXTENDED RELEASE ORAL DAILY
Status: DISCONTINUED | OUTPATIENT
Start: 2017-12-09 | End: 2017-12-10 | Stop reason: HOSPADM

## 2017-12-09 RX ORDER — BUMETANIDE 1 MG/1
1 TABLET ORAL DAILY
Qty: 30 TABLET | Refills: 3 | DISCHARGE
Start: 2017-12-10

## 2017-12-09 RX ORDER — DIPHENHYDRAMINE HCL 25 MG
25 TABLET ORAL EVERY 6 HOURS PRN
DISCHARGE
Start: 2017-12-09 | End: 2018-01-08

## 2017-12-09 RX ORDER — METOPROLOL TARTRATE 100 MG/1
100 TABLET ORAL 2 TIMES DAILY
Qty: 60 TABLET | Refills: 3 | DISCHARGE
Start: 2017-12-09

## 2017-12-09 RX ORDER — POTASSIUM CHLORIDE 20 MEQ/1
40 TABLET, EXTENDED RELEASE ORAL ONCE
Status: COMPLETED | OUTPATIENT
Start: 2017-12-09 | End: 2017-12-09

## 2017-12-09 RX ORDER — HYDROCODONE BITARTRATE AND ACETAMINOPHEN 5; 325 MG/1; MG/1
1 TABLET ORAL EVERY 6 HOURS PRN
Qty: 11 TABLET | Refills: 0 | Status: SHIPPED | OUTPATIENT
Start: 2017-12-09

## 2017-12-09 RX ORDER — DIGOXIN 125 MCG
125 TABLET ORAL DAILY
Status: DISCONTINUED | OUTPATIENT
Start: 2017-12-09 | End: 2017-12-10 | Stop reason: HOSPADM

## 2017-12-09 RX ADMIN — GABAPENTIN 100 MG: 100 CAPSULE ORAL at 14:08

## 2017-12-09 RX ADMIN — IPRATROPIUM BROMIDE AND ALBUTEROL SULFATE 1 AMPULE: .5; 3 SOLUTION RESPIRATORY (INHALATION) at 16:53

## 2017-12-09 RX ADMIN — APIXABAN 5 MG: 5 TABLET, FILM COATED ORAL at 08:54

## 2017-12-09 RX ADMIN — DIPHENHYDRAMINE HCL 25 MG: 25 TABLET ORAL at 05:27

## 2017-12-09 RX ADMIN — METOPROLOL TARTRATE 100 MG: 100 TABLET, FILM COATED ORAL at 08:54

## 2017-12-09 RX ADMIN — DILTIAZEM HYDROCHLORIDE 60 MG: 60 TABLET, FILM COATED ORAL at 05:27

## 2017-12-09 RX ADMIN — DILTIAZEM HYDROCHLORIDE 240 MG: 240 CAPSULE, COATED, EXTENDED RELEASE ORAL at 08:54

## 2017-12-09 RX ADMIN — POTASSIUM CHLORIDE 40 MEQ: 1500 TABLET, EXTENDED RELEASE ORAL at 11:12

## 2017-12-09 RX ADMIN — DIGOXIN 125 MCG: 0.12 TABLET ORAL at 08:54

## 2017-12-09 ASSESSMENT — PAIN DESCRIPTION - DESCRIPTORS: DESCRIPTORS: TENDER

## 2017-12-09 ASSESSMENT — PAIN DESCRIPTION - LOCATION: LOCATION: NECK

## 2017-12-09 ASSESSMENT — PAIN DESCRIPTION - FREQUENCY: FREQUENCY: CONTINUOUS

## 2017-12-09 ASSESSMENT — PAIN SCALES - GENERAL
PAINLEVEL_OUTOF10: 8
PAINLEVEL_OUTOF10: 8

## 2017-12-09 ASSESSMENT — PAIN DESCRIPTION - ORIENTATION: ORIENTATION: POSTERIOR

## 2017-12-09 NOTE — PROGRESS NOTES
She is quite sleepy sitting up in her chair    BP severely elevated      12/8/2017  Pt awake -   Daughter at bedside    Pt has refused pills this am - she states she couldn't swallow them - they were too big - she agrees to crushing and putting in applesauce or pudding    No cardiac issues  Agree needs diuresed    On chart say [facility]  Discussed with daughter - sounds like DNR CCAx4    12/9/2017  Pt eating breakfast - pleasant and cooperative  AFB CVR  VSS  No c/o chest pains   She is scratching at her shingles and has pulled off dressings      Objective:   /75   Pulse 82   Temp 98.4 °F (36.9 °C) (Oral)   Resp 22   Ht 5' 2\" (1.575 m)   Wt 178 lb (80.7 kg)   SpO2 92%   BMI 32.56 kg/m²        TELEMETRY: pt wont wear tele     Physical Exam:  General Appearance: alert - opens eye - confused -  in no acute distress  Cardiovascular: irregular rhythm, normal S1 and S2, no murmurs, rubs, clicks, or gallops, distal pulses intact,   Pulmonary/Chest: diminshed to auscultation bilaterally- no wheezes, rales or rhonchi, normal air movement, no respiratory distress  Abdomen: soft, non-tender, non-distended, normal bowel sounds, no masses Extremities: no cyanosis, clubbing or edema, pulses present   Skin: warm and dry, no rash or erythema  Head: normocephalic and atraumatic  Musculoskeletal: normal range of motion, no joint swelling, deformity or tenderness  Neurological: lethargic, no focal findings or movement disorder noted    Medications:    diltiazem  240 mg Oral Daily    digoxin  125 mcg Oral Daily    apixaban  5 mg Oral BID    potassium chloride  40 mEq Oral Once    bumetanide  1 mg Oral Daily    potassium chloride  20 mEq Oral Daily    metoprolol tartrate  100 mg Oral BID    ipratropium-albuterol  1 ampule Inhalation Q4H While awake    escitalopram  5 mg Oral Daily    sodium chloride flush  10 mL Intravenous 2 times per day    donepezil  22.5 mg Oral Nightly    memantine  10 mg Oral BID    metoprolol  5 mg Intravenous Once    magnesium replacement protocol   Other RX Placeholder    potassium replacement protocol   Other RX Placeholder    lactobacillus  1 capsule Oral Daily with breakfast    gabapentin  100 mg Oral TID          diphenhydrAMINE 25 mg Q6H PRN   acetaminophen 650 mg Q4H PRN   HYDROcodone-acetaminophen 1 tablet Q6H PRN   sodium chloride flush 10 mL PRN   docusate sodium 100 mg BID PRN   ondansetron 4 mg Q6H PRN       Diagnostics:  EK-DEC-2017 03:31:11 [facility]-6K ROUTINE RETRIEVAL  Atrial fibrillation  Left bundle branch block  Abnormal ECG  When compared with ECG of 2017 12:29,  Premature atrial complexes are now Present  SD interval has decreased  Ventricular rate has increased BY 51 BPM  T wave amplitude has increased in Anterior leads  Confirmed by KEYA CALVILLO ([id number]) on 2017 5:31:44 PM    Echo:   SUMMARY:     Left ventricle:  Size was normal.  Systolic function was normal by visual assessment. Ejection fraction was estimated in the range of 55 % to 60 %. There were no regional wall motion abnormalities. Wall thickness was mildly increased. Doppler parameters were consistent with abnormal left ventricular relaxation (grade 1 diastolic dysfunction).    Left atrium:  Size was normal.     Mitral valve: There was mild annular calcification. Valve structure was normal.  There was mild regurgitation.     Aortic valve: The valve was trileaflet. Leaflets exhibited mildly increased thickness and normal cuspal separation. There was no evidence for stenosis. There was moderate regurgitation. There was possible reversal of blood flow in the left main coronary artery. If this  is the case, the patient may indeed have severe AI.     Tricuspid valve: There was mild regurgitation.     Pericardium:  There was no pericardial effusion.     Pulmonary arteries:  Systolic pressure was within the normal range.     Aorta, systemic arteries:   The root exhibited mild dilatation.     Prepared and signed by     Antoinette Schirmer, MD  Signed 07-Aug-2015         Left Heart Cath:   7/13/2013: Stenting of PLB of LCx using Promus 2.5 X 12 mm, and Proximal LCX using Promus 3.5 X 12 mm. Done in [facility]. Lab Data:    Cardiac Enzymes:  No results for input(s): CKTOTAL, CKMB, CKMBINDEX, TROPONINI in the last 72 hours. CBC:   Lab Results   Component Value Date    WBC 8.6 12/07/2017    RBC 4.17 12/07/2017    HGB 13.0 12/07/2017    HCT 38.2 12/07/2017     12/07/2017       CMP:    Lab Results   Component Value Date     12/09/2017    K 3.5 12/09/2017     12/09/2017    CO2 23 12/09/2017    BUN 17 12/09/2017    CREATININE 0.7 12/09/2017    LABGLOM 79 12/09/2017    GLUCOSE 130 12/09/2017    CALCIUM 8.1 12/09/2017       Hepatic Function Panel:    Lab Results   Component Value Date    ALKPHOS 68 12/05/2017    ALT 18 12/05/2017    AST 24 12/05/2017    PROT 6.1 12/05/2017    BILITOT 0.4 12/05/2017    LABALBU 2.9 12/05/2017       Magnesium:    Lab Results   Component Value Date    MG 1.8 12/06/2017       PT/INR:    Lab Results   Component Value Date    INR 0.97 06/11/2017       HgBA1c:  No results found for: LABA1C    FLP:    Lab Results   Component Value Date    TRIG 100 12/06/2017    HDL 25 12/06/2017    LDLCALC 88 12/06/2017       TSH:    Lab Results   Component Value Date    TSH 1.840 12/06/2017         Assessment:    PNA  Fevers? Acute diastolic chf secondary to AFB RVR / HTN    Shingles to Lt neck and shoulder/ chest area    New afb with RVR of unknown duration - CVR   [id number] at least 3     Elevated trop: 0.017 / 0.021  Not MI   Demand ischemia from AFB / infectious process    CAD: PCI 2013    HTN  HLP    DNR CC?   Discussed with family- they want CCA x4      Pt refusing meds- family ok with it      Plan:    Continue rate control of AFB  Continue FS lovenox at present- she isnt a good candidate for long term [address] DT confusion, advanced age and bleeding risk      Echo pending    BP control    No plans for ischemic work-up at present    We will follow prn-   Please call for questions or concerns       Electronically signed by Kyle Gómez CNP on 12/9/2017 at 9:03 AM

## 2017-12-09 NOTE — PROGRESS NOTES
Discharge teaching and instructions for diagnosis/procedure of shingles, cough completed with patient using teachback method. AVS reviewed. Printed prescriptions given to patient. Patient voiced understanding regarding prescriptions, follow up appointments, and care of self at home.  Discharged via cart/stretcher to  skilled nursing per EMS transportation

## 2017-12-09 NOTE — FLOWSHEET NOTE
12/09/17 1547   Handoff   Communication Given Transfer Handoff   Oncoming Nurse/Offgoing Nurse Blue Cisneros  ([address] [facility])   Handoff Communication Telephone   Time Handoff Given 0820   Report called spoke Blue Cisneros  AVS faxed to [phone] [facility]

## 2017-12-09 NOTE — PROGRESS NOTES
5383 - call placed to [facility] EUFEMIA MARCUM [address] II.CYNTHIAERTLANA Molina per Dr Rea Perkins request to inquire if patient's room is available. Response - the [facility] is occupied [facility] anticipate discharge today and request that RN call back this afternoon to see timeline of room availability. 0900 - Dr Rea Perkins updated, discharge orders placed  [phone] - call #2 placed to [facility] EUFEMIA  [employer] II.VIERTEL Con for room availability, no answer message left.    Ashanti Godfrey 22 called stating room is ready for patient to be discharged to [place] [phone] notified waiting for response  1500 - approximate  time 1945 per KORINA

## 2017-12-10 PROBLEM — B02.8 HERPES ZOSTER WITH COMPLICATION: Status: ACTIVE | Noted: 2017-12-05

## 2017-12-10 NOTE — DISCHARGE SUMMARY
wound  Neurologic:  Neurovascularly intact without any focal sensory/motor deficits. Cranial nerves: II-XII intact, grossly non-focal.  Psychiatric:  Alert         Labs: For convenience and continuity at follow-up the following most recent labs are provided:      CBC:    Lab Results   Component Value Date    WBC 8.6 12/07/2017    HGB 13.0 12/07/2017    HCT 38.2 12/07/2017     12/07/2017       Renal:  Lab Results   Component Value Date     12/09/2017    K 3.5 12/09/2017     12/09/2017    CO2 23 12/09/2017    BUN 17 12/09/2017    CREATININE 0.7 12/09/2017    CALCIUM 8.1 12/09/2017         Significant Diagnostic Studies    Radiology:   XR Chest Portable   Final Result   1. Decreased density of the patchy infiltrates in both lower lobes. Such rapid change probably indicates there was some asymmetric interstitial pulmonary edema in these regions. 2.  No other significant change since December 5, 2017. **This report has been created using voice recognition software. It may contain minor errors which are inherent in voice recognition technology. **      Final report electronically signed by Dr. Ashtyn Cerna on 12/8/2017 5:27 AM      XR Chest Portable   Final Result   1. Mild cardiac megaly. Metallic sternotomy sutures. No effusion is seen. 2. Moderate atelectasis/pneumonia right mid and lower lung fields. Mild infiltrate left lung base. These infiltrates were not present on the prior study. **This report has been created using voice recognition software. It may contain minor errors which are inherent in voice recognition technology. **      Final report electronically signed by Dr. James Jalloh on 12/7/2017 1:41 PM      XR CHEST STANDARD (2 VW)   Final Result   Cardiomegaly. No other acute findings            **This report has been created using voice recognition software. It may contain minor errors which are inherent in voice recognition technology. **      Final mouth daily             gabapentin (NEURONTIN) 100 MG capsule  Take 1 capsule by mouth 3 times daily             HYDROcodone-acetaminophen (NORCO) 5-325 MG per tablet  Take 1 tablet by mouth every 6 hours as needed for Pain . memantine (NAMENDA XR) 28 MG CP24 capsule  Take 28 mg by mouth daily              metoprolol (LOPRESSOR) 100 MG tablet  Take 1 tablet by mouth 2 times daily             potassium chloride (KLOR-CON M) 20 MEQ extended release tablet  Take 1 tablet by mouth daily             therapeutic multivitamin-minerals (THERAGRAN-M) tablet  Take 1 tablet by mouth daily. Time Spent on discharge is more than 45 minutes in the examination, evaluation, counseling and review of medications and discharge plan. Signed: Thank you Lisandro Varma MD for the opportunity to be involved in this patient's care.     Electronically signed by Tiffany Christian MD on 12/10/2017 at 2:22 PM

## 2017-12-11 LAB
BLOOD CULTURE, ROUTINE: NORMAL
BLOOD CULTURE, ROUTINE: NORMAL

## 2017-12-14 ASSESSMENT — ENCOUNTER SYMPTOMS
NAUSEA: 0
WHEEZING: 0
DIARRHEA: 0
EYE PAIN: 0
SHORTNESS OF BREATH: 0
BACK PAIN: 0
ABDOMINAL PAIN: 0
SORE THROAT: 0
RHINORRHEA: 0
EYE DISCHARGE: 0
COUGH: 1
VOMITING: 0

## 2018-04-12 PROBLEM — R77.8 ELEVATED TROPONIN: Status: RESOLVED | Noted: 2017-12-06 | Resolved: 2018-04-12

## 2021-06-03 NOTE — ED NOTES
Patient sitting up in bed requesting to be left alone. Denies needs. Daughters at bedside.       Celsa Jacobson RN  12/05/17 4040 Attending Attestation (For Attendings USE Only)...